# Patient Record
Sex: FEMALE | Race: WHITE | NOT HISPANIC OR LATINO | URBAN - METROPOLITAN AREA
[De-identification: names, ages, dates, MRNs, and addresses within clinical notes are randomized per-mention and may not be internally consistent; named-entity substitution may affect disease eponyms.]

---

## 2018-01-23 ENCOUNTER — INPATIENT (INPATIENT)
Facility: HOSPITAL | Age: 58
LOS: 0 days | Discharge: HOME | End: 2018-01-24
Attending: INTERNAL MEDICINE

## 2018-02-01 DIAGNOSIS — F32.9 MAJOR DEPRESSIVE DISORDER, SINGLE EPISODE, UNSPECIFIED: ICD-10-CM

## 2018-02-01 DIAGNOSIS — Z90.49 ACQUIRED ABSENCE OF OTHER SPECIFIED PARTS OF DIGESTIVE TRACT: ICD-10-CM

## 2018-02-01 DIAGNOSIS — I10 ESSENTIAL (PRIMARY) HYPERTENSION: ICD-10-CM

## 2018-02-01 DIAGNOSIS — M19.90 UNSPECIFIED OSTEOARTHRITIS, UNSPECIFIED SITE: ICD-10-CM

## 2018-02-01 DIAGNOSIS — R10.9 UNSPECIFIED ABDOMINAL PAIN: ICD-10-CM

## 2018-02-01 DIAGNOSIS — E78.5 HYPERLIPIDEMIA, UNSPECIFIED: ICD-10-CM

## 2018-02-01 DIAGNOSIS — K21.9 GASTRO-ESOPHAGEAL REFLUX DISEASE WITHOUT ESOPHAGITIS: ICD-10-CM

## 2018-02-01 DIAGNOSIS — Z90.710 ACQUIRED ABSENCE OF BOTH CERVIX AND UTERUS: ICD-10-CM

## 2018-02-01 DIAGNOSIS — Z79.82 LONG TERM (CURRENT) USE OF ASPIRIN: ICD-10-CM

## 2018-02-01 DIAGNOSIS — Z82.3 FAMILY HISTORY OF STROKE: ICD-10-CM

## 2018-02-01 PROBLEM — Z00.00 ENCOUNTER FOR PREVENTIVE HEALTH EXAMINATION: Status: ACTIVE | Noted: 2018-02-01

## 2018-02-04 DIAGNOSIS — G47.00 INSOMNIA, UNSPECIFIED: ICD-10-CM

## 2018-02-04 DIAGNOSIS — K29.50 UNSPECIFIED CHRONIC GASTRITIS WITHOUT BLEEDING: ICD-10-CM

## 2018-02-04 DIAGNOSIS — M19.90 UNSPECIFIED OSTEOARTHRITIS, UNSPECIFIED SITE: ICD-10-CM

## 2018-02-04 DIAGNOSIS — I10 ESSENTIAL (PRIMARY) HYPERTENSION: ICD-10-CM

## 2018-02-04 DIAGNOSIS — F93.0 SEPARATION ANXIETY DISORDER OF CHILDHOOD: ICD-10-CM

## 2018-02-04 DIAGNOSIS — E78.5 HYPERLIPIDEMIA, UNSPECIFIED: ICD-10-CM

## 2018-02-04 DIAGNOSIS — R10.9 UNSPECIFIED ABDOMINAL PAIN: ICD-10-CM

## 2018-02-04 DIAGNOSIS — K29.70 GASTRITIS, UNSPECIFIED, WITHOUT BLEEDING: ICD-10-CM

## 2018-02-04 DIAGNOSIS — R06.02 SHORTNESS OF BREATH: ICD-10-CM

## 2018-03-17 ENCOUNTER — OUTPATIENT (OUTPATIENT)
Dept: OUTPATIENT SERVICES | Facility: HOSPITAL | Age: 58
LOS: 1 days | Discharge: HOME | End: 2018-03-17

## 2018-03-17 DIAGNOSIS — R91.1 SOLITARY PULMONARY NODULE: ICD-10-CM

## 2018-09-01 ENCOUNTER — INPATIENT (INPATIENT)
Facility: HOSPITAL | Age: 58
LOS: 3 days | Discharge: HOME | End: 2018-09-05
Attending: INTERNAL MEDICINE | Admitting: INTERNAL MEDICINE

## 2018-09-01 VITALS
TEMPERATURE: 97 F | SYSTOLIC BLOOD PRESSURE: 144 MMHG | DIASTOLIC BLOOD PRESSURE: 68 MMHG | OXYGEN SATURATION: 99 % | RESPIRATION RATE: 18 BRPM | HEART RATE: 55 BPM

## 2018-09-01 LAB
ALBUMIN SERPL ELPH-MCNC: 4.4 G/DL — SIGNIFICANT CHANGE UP (ref 3.5–5.2)
ALP SERPL-CCNC: 48 U/L — SIGNIFICANT CHANGE UP (ref 30–115)
ALT FLD-CCNC: 22 U/L — SIGNIFICANT CHANGE UP (ref 0–41)
ANION GAP SERPL CALC-SCNC: 21 MMOL/L — HIGH (ref 7–14)
AST SERPL-CCNC: 20 U/L — SIGNIFICANT CHANGE UP (ref 0–41)
BASE EXCESS BLDV CALC-SCNC: 4 MMOL/L — HIGH (ref -2–2)
BASOPHILS # BLD AUTO: 0.03 K/UL — SIGNIFICANT CHANGE UP (ref 0–0.2)
BASOPHILS NFR BLD AUTO: 0.4 % — SIGNIFICANT CHANGE UP (ref 0–1)
BILIRUB SERPL-MCNC: 0.3 MG/DL — SIGNIFICANT CHANGE UP (ref 0.2–1.2)
BUN SERPL-MCNC: 10 MG/DL — SIGNIFICANT CHANGE UP (ref 10–20)
CA-I SERPL-SCNC: 1.21 MMOL/L — SIGNIFICANT CHANGE UP (ref 1.12–1.3)
CALCIUM SERPL-MCNC: 10 MG/DL — SIGNIFICANT CHANGE UP (ref 8.5–10.1)
CHLORIDE SERPL-SCNC: 99 MMOL/L — SIGNIFICANT CHANGE UP (ref 98–110)
CO2 SERPL-SCNC: 22 MMOL/L — SIGNIFICANT CHANGE UP (ref 17–32)
CREAT SERPL-MCNC: 0.7 MG/DL — SIGNIFICANT CHANGE UP (ref 0.7–1.5)
EOSINOPHIL # BLD AUTO: 0.2 K/UL — SIGNIFICANT CHANGE UP (ref 0–0.7)
EOSINOPHIL NFR BLD AUTO: 2.5 % — SIGNIFICANT CHANGE UP (ref 0–8)
GAS PNL BLDV: 144 MMOL/L — SIGNIFICANT CHANGE UP (ref 136–145)
GAS PNL BLDV: SIGNIFICANT CHANGE UP
GLUCOSE SERPL-MCNC: 107 MG/DL — HIGH (ref 70–99)
HCO3 BLDV-SCNC: 29 MMOL/L — SIGNIFICANT CHANGE UP (ref 22–29)
HCT VFR BLD CALC: 39.8 % — SIGNIFICANT CHANGE UP (ref 37–47)
HCT VFR BLDA CALC: 35.2 % — SIGNIFICANT CHANGE UP (ref 34–44)
HGB BLD CALC-MCNC: 11.5 G/DL — LOW (ref 14–18)
HGB BLD-MCNC: 13 G/DL — SIGNIFICANT CHANGE UP (ref 12–16)
IMM GRANULOCYTES NFR BLD AUTO: 0.4 % — HIGH (ref 0.1–0.3)
LACTATE BLDV-MCNC: 1.5 MMOL/L — SIGNIFICANT CHANGE UP (ref 0.5–1.6)
LYMPHOCYTES # BLD AUTO: 2.63 K/UL — SIGNIFICANT CHANGE UP (ref 1.2–3.4)
LYMPHOCYTES # BLD AUTO: 32.5 % — SIGNIFICANT CHANGE UP (ref 20.5–51.1)
MCHC RBC-ENTMCNC: 28.3 PG — SIGNIFICANT CHANGE UP (ref 27–31)
MCHC RBC-ENTMCNC: 32.7 G/DL — SIGNIFICANT CHANGE UP (ref 32–37)
MCV RBC AUTO: 86.5 FL — SIGNIFICANT CHANGE UP (ref 81–99)
MONOCYTES # BLD AUTO: 0.49 K/UL — SIGNIFICANT CHANGE UP (ref 0.1–0.6)
MONOCYTES NFR BLD AUTO: 6.1 % — SIGNIFICANT CHANGE UP (ref 1.7–9.3)
NEUTROPHILS # BLD AUTO: 4.71 K/UL — SIGNIFICANT CHANGE UP (ref 1.4–6.5)
NEUTROPHILS NFR BLD AUTO: 58.1 % — SIGNIFICANT CHANGE UP (ref 42.2–75.2)
NRBC # BLD: 0 /100 WBCS — SIGNIFICANT CHANGE UP (ref 0–0)
NT-PROBNP SERPL-SCNC: 90 PG/ML — SIGNIFICANT CHANGE UP (ref 0–300)
PCO2 BLDV: 42 MMHG — SIGNIFICANT CHANGE UP (ref 41–51)
PH BLDV: 7.44 — HIGH (ref 7.26–7.43)
PLATELET # BLD AUTO: 262 K/UL — SIGNIFICANT CHANGE UP (ref 130–400)
PO2 BLDV: 36 MMHG — SIGNIFICANT CHANGE UP (ref 20–40)
POTASSIUM BLDV-SCNC: 3.3 MMOL/L — SIGNIFICANT CHANGE UP (ref 3.3–5.6)
POTASSIUM SERPL-MCNC: 4.2 MMOL/L — SIGNIFICANT CHANGE UP (ref 3.5–5)
POTASSIUM SERPL-SCNC: 4.2 MMOL/L — SIGNIFICANT CHANGE UP (ref 3.5–5)
PROT SERPL-MCNC: 6.9 G/DL — SIGNIFICANT CHANGE UP (ref 6–8)
RBC # BLD: 4.6 M/UL — SIGNIFICANT CHANGE UP (ref 4.2–5.4)
RBC # FLD: 13.3 % — SIGNIFICANT CHANGE UP (ref 11.5–14.5)
SAO2 % BLDV: 68 % — SIGNIFICANT CHANGE UP
SODIUM SERPL-SCNC: 142 MMOL/L — SIGNIFICANT CHANGE UP (ref 135–146)
TROPONIN T SERPL-MCNC: <0.01 NG/ML — SIGNIFICANT CHANGE UP
WBC # BLD: 8.09 K/UL — SIGNIFICANT CHANGE UP (ref 4.8–10.8)
WBC # FLD AUTO: 8.09 K/UL — SIGNIFICANT CHANGE UP (ref 4.8–10.8)

## 2018-09-01 RX ORDER — IPRATROPIUM/ALBUTEROL SULFATE 18-103MCG
3 AEROSOL WITH ADAPTER (GRAM) INHALATION ONCE
Qty: 0 | Refills: 0 | Status: COMPLETED | OUTPATIENT
Start: 2018-09-01 | End: 2018-09-01

## 2018-09-01 RX ORDER — ALBUTEROL 90 UG/1
2.5 AEROSOL, METERED ORAL ONCE
Qty: 0 | Refills: 0 | Status: COMPLETED | OUTPATIENT
Start: 2018-09-01 | End: 2018-09-01

## 2018-09-01 RX ORDER — AMITRIPTYLINE HCL 25 MG
25 TABLET ORAL ONCE
Qty: 0 | Refills: 0 | Status: COMPLETED | OUTPATIENT
Start: 2018-09-01 | End: 2018-09-01

## 2018-09-01 RX ORDER — FAMOTIDINE 10 MG/ML
20 INJECTION INTRAVENOUS ONCE
Qty: 0 | Refills: 0 | Status: COMPLETED | OUTPATIENT
Start: 2018-09-01 | End: 2018-09-01

## 2018-09-01 RX ORDER — MAGNESIUM SULFATE 500 MG/ML
2 VIAL (ML) INJECTION ONCE
Qty: 0 | Refills: 0 | Status: COMPLETED | OUTPATIENT
Start: 2018-09-01 | End: 2018-09-01

## 2018-09-01 RX ADMIN — Medication 3 MILLILITER(S): at 16:47

## 2018-09-01 RX ADMIN — ALBUTEROL 2.5 MILLIGRAM(S): 90 AEROSOL, METERED ORAL at 23:45

## 2018-09-01 RX ADMIN — Medication 25 MILLIGRAM(S): at 21:43

## 2018-09-01 RX ADMIN — Medication 30 MILLILITER(S): at 23:45

## 2018-09-01 RX ADMIN — Medication 80 MILLIGRAM(S): at 23:37

## 2018-09-01 RX ADMIN — Medication 3 MILLILITER(S): at 17:51

## 2018-09-01 RX ADMIN — Medication 125 MILLIGRAM(S): at 16:47

## 2018-09-01 RX ADMIN — Medication 50 GRAM(S): at 16:55

## 2018-09-01 RX ADMIN — FAMOTIDINE 104 MILLIGRAM(S): 10 INJECTION INTRAVENOUS at 23:45

## 2018-09-01 RX ADMIN — Medication 3 MILLILITER(S): at 18:51

## 2018-09-01 NOTE — ED PROVIDER NOTE - ATTENDING CONTRIBUTION TO CARE
58 year old female, hx "wheezing" in the past, former smoker, never official dx with COPD, arthritis, HTN, presenting with a several month history of dyspnea, that worsened over the past few days. Patient states she had a nuclear stress test a few weeks ago which was negative. Denies fevers, headache, vision changes, weakness/numbness, confusion, URI symptoms, neck pain, back pain, palpitations, nausea, vomiting, abdominal pain, diarrhea, constipation, blood in stool/dark stools, urinary symptoms, vaginal bleeding/discharge, leg swelling, rash, recent travel or sick contacts, but admits to dry cough.    Vital Signs: I have reviewed the initial vital signs.  Constitutional: NAD, well-nourished, appears stated age, no acute distress.  HEENT: Airway patent, moist MM, no erythema/swelling/deformity of oral structures. EOMI, PERRLA.  CV: regular rate, regular rhythm, well-perfused extremities, 2+ b/l DP and radial pulses equal.  Lungs: Bilateral wheezing, no increased WOB.  ABD: NTND, no guarding or rebound, no pulsatile mass, no hernias.   MSK: Neck supple, nontender, nl ROM, no stepoff. Chest nontender. Back nontender in TLS spine or to b/l bony structures or flanks. Ext nontender, nl rom, no deformity.   INTEG: Skin warm, dry, no rash.  NEURO: A&Ox3, CN II-XII intact, normal strength 5/5 all 4 ext, nl sensation throughout, normal speech and coordination.  PSYCH: Calm, cooperative, normal affect and interaction.    Will tx with nebs, solumedrol, magnesium, get labs, CXR, re-eval.

## 2018-09-01 NOTE — ED CDU PROVIDER INITIAL DAY NOTE - OBJECTIVE STATEMENT
57y/o female with pmh of htn, hld, acid reflux, pt. c/o sob for several weeks and worsening over the last couple of days. denies fever, chills, cough, cp, nausea, vomiting. pt. states she was wheezing and had similar sx many times in the past but she has never seen a pulmonologist. ex smoker. cardiologist dr. Krishna. pt. is Lao speaking, translated by daughter in law who is at bedside.

## 2018-09-01 NOTE — ED PROVIDER NOTE - MEDICAL DECISION MAKING DETAILS
Patient presented with dyspnea and wheezing, suspected COPD exacerbation given extensive smoking history. Patient give treatment in ED which improved her breathing significantly but still had mild intermittent dyspnea. Patient otherwise well appearing and mild enough that she met observation criteria per obs protocol, and therefore she was placed in obs for further tx and monitoring, with re-eval in the AM.

## 2018-09-01 NOTE — ED PROVIDER NOTE - PHYSICAL EXAMINATION
CONSTITUTIONAL: Well-developed; well-nourished; in no acute distress.   SKIN: warm, dry  HEAD: Normocephalic; atraumatic.  EYES: PERRL, EOMI, normal sclera and conjunctiva   ENT: No nasal discharge; airway clear.  NECK: Supple; non tender.  CARD: S1, S2 normal; no murmurs, gallops, or rubs. Regular rate and rhythm.   RESP: wheezes heard b/l. mildly increased wob.  ABD: soft ntnd  EXT: Normal ROM.  No clubbing, cyanosis or edema.   LYMPH: No acute cervical adenopathy.  NEURO: Alert, oriented, grossly unremarkable  PSYCH: Cooperative, appropriate.

## 2018-09-01 NOTE — ED PROVIDER NOTE - PROGRESS NOTE DETAILS
Patient re-evaluated - feeling better after tx but still mildly wheezy on exam. Patient meets obs criteria given mild COPD, no PNA, O2 saturation on RA 99%. Will place in obs.

## 2018-09-01 NOTE — ED PROVIDER NOTE - NS ED ROS FT
Eyes:  No visual changes, eye pain or discharge.  ENMT:  No hearing changes, pain, discharge or infections. No neck pain or stiffness.  Cardiac:  No chest pain, SOB or edema. No chest pain with exertion.  Respiratory:  No cough. +sob  GI:  No nausea, vomiting, diarrhea or abdominal pain.  :  No dysuria, frequency or burning.  MS:  No myalgia, muscle weakness, joint pain or back pain.  Neuro:  No headache or weakness.  No LOC.  Skin:  No skin rash.   Endocrine: No history of thyroid disease or diabetes.

## 2018-09-01 NOTE — ED ADULT NURSE REASSESSMENT NOTE - NS ED NURSE REASSESS COMMENT FT1
Pt reassessed report filling the same ,C.O left  side upper back pain comfort provide HOB elevated 35degree ED attending made aware SPO2 98% R.A on going nursing observation .

## 2018-09-01 NOTE — ED PROVIDER NOTE - OBJECTIVE STATEMENT
pt is a 58yof with no pmhx complaining of worsening sob for the last week. Pt was a lifetime smoker, but has no diagnosis of COPD. pt denies cough, chest pain, fever, abdominal pain, headache, leg swelling, dysuria, diarrhea, nausea, vomiting

## 2018-09-01 NOTE — ED ADULT NURSE REASSESSMENT NOTE - NS ED NURSE REASSESS COMMENT FT1
patient continues on observation. cardiac monitoring in place. NAD noted. patient resting comfortably. all care rendered.

## 2018-09-01 NOTE — ED CDU PROVIDER INITIAL DAY NOTE - ATTENDING CONTRIBUTION TO CARE
Seen with PA agree with above, lungs- scattered wheezing, abdomen- soft non tender, s1s2 no gallops or murmurs, full workup in progress will continue to re-evaluate

## 2018-09-01 NOTE — ED ADULT NURSE NOTE - NSIMPLEMENTINTERV_GEN_ALL_ED
Implemented All Universal Safety Interventions:  [place] to call system. Call bell, personal items and telephone within reach. Instruct patient to call for assistance. Room bathroom lighting operational. Non-slip footwear when patient is off stretcher. Physically safe environment: no spills, clutter or unnecessary equipment. Stretcher in lowest position, wheels locked, appropriate side rails in place.

## 2018-09-02 DIAGNOSIS — Z90.710 ACQUIRED ABSENCE OF BOTH CERVIX AND UTERUS: Chronic | ICD-10-CM

## 2018-09-02 DIAGNOSIS — Z90.49 ACQUIRED ABSENCE OF OTHER SPECIFIED PARTS OF DIGESTIVE TRACT: Chronic | ICD-10-CM

## 2018-09-02 DIAGNOSIS — Z98.890 OTHER SPECIFIED POSTPROCEDURAL STATES: Chronic | ICD-10-CM

## 2018-09-02 RX ORDER — ASPIRIN/CALCIUM CARB/MAGNESIUM 324 MG
81 TABLET ORAL DAILY
Qty: 0 | Refills: 0 | Status: DISCONTINUED | OUTPATIENT
Start: 2018-09-02 | End: 2018-09-05

## 2018-09-02 RX ORDER — ALBUTEROL 90 UG/1
2.5 AEROSOL, METERED ORAL ONCE
Qty: 0 | Refills: 0 | Status: COMPLETED | OUTPATIENT
Start: 2018-09-02 | End: 2018-09-02

## 2018-09-02 RX ORDER — HYDROCHLOROTHIAZIDE 25 MG
12.5 TABLET ORAL ONCE
Qty: 0 | Refills: 0 | Status: COMPLETED | OUTPATIENT
Start: 2018-09-02 | End: 2018-09-02

## 2018-09-02 RX ORDER — AMITRIPTYLINE HCL 25 MG
25 TABLET ORAL AT BEDTIME
Qty: 0 | Refills: 0 | Status: DISCONTINUED | OUTPATIENT
Start: 2018-09-02 | End: 2018-09-05

## 2018-09-02 RX ORDER — METOPROLOL TARTRATE 50 MG
50 TABLET ORAL DAILY
Qty: 0 | Refills: 0 | Status: DISCONTINUED | OUTPATIENT
Start: 2018-09-02 | End: 2018-09-05

## 2018-09-02 RX ORDER — METOPROLOL TARTRATE 50 MG
1 TABLET ORAL
Qty: 0 | Refills: 0 | COMMUNITY

## 2018-09-02 RX ORDER — LOSARTAN POTASSIUM 100 MG/1
1 TABLET, FILM COATED ORAL
Qty: 0 | Refills: 0 | COMMUNITY

## 2018-09-02 RX ORDER — ATORVASTATIN CALCIUM 80 MG/1
20 TABLET, FILM COATED ORAL AT BEDTIME
Qty: 0 | Refills: 0 | Status: DISCONTINUED | OUTPATIENT
Start: 2018-09-02 | End: 2018-09-05

## 2018-09-02 RX ORDER — HEPARIN SODIUM 5000 [USP'U]/ML
5000 INJECTION INTRAVENOUS; SUBCUTANEOUS EVERY 8 HOURS
Qty: 0 | Refills: 0 | Status: DISCONTINUED | OUTPATIENT
Start: 2018-09-02 | End: 2018-09-05

## 2018-09-02 RX ORDER — ASPIRIN/CALCIUM CARB/MAGNESIUM 324 MG
1 TABLET ORAL
Qty: 0 | Refills: 0 | COMMUNITY

## 2018-09-02 RX ORDER — IPRATROPIUM/ALBUTEROL SULFATE 18-103MCG
3 AEROSOL WITH ADAPTER (GRAM) INHALATION EVERY 6 HOURS
Qty: 0 | Refills: 0 | Status: DISCONTINUED | OUTPATIENT
Start: 2018-09-02 | End: 2018-09-05

## 2018-09-02 RX ORDER — LOSARTAN POTASSIUM 100 MG/1
50 TABLET, FILM COATED ORAL DAILY
Qty: 0 | Refills: 0 | Status: DISCONTINUED | OUTPATIENT
Start: 2018-09-02 | End: 2018-09-05

## 2018-09-02 RX ORDER — AMITRIPTYLINE HCL 25 MG
1 TABLET ORAL
Qty: 0 | Refills: 0 | COMMUNITY

## 2018-09-02 RX ORDER — HYDROCHLOROTHIAZIDE 25 MG
12.5 TABLET ORAL DAILY
Qty: 0 | Refills: 0 | Status: DISCONTINUED | OUTPATIENT
Start: 2018-09-02 | End: 2018-09-05

## 2018-09-02 RX ORDER — PANTOPRAZOLE SODIUM 20 MG/1
40 TABLET, DELAYED RELEASE ORAL
Qty: 0 | Refills: 0 | Status: DISCONTINUED | OUTPATIENT
Start: 2018-09-02 | End: 2018-09-05

## 2018-09-02 RX ORDER — METOPROLOL TARTRATE 50 MG
0 TABLET ORAL
Qty: 0 | Refills: 0 | COMMUNITY

## 2018-09-02 RX ORDER — OMEPRAZOLE 10 MG/1
1 CAPSULE, DELAYED RELEASE ORAL
Qty: 0 | Refills: 0 | COMMUNITY

## 2018-09-02 RX ADMIN — Medication 12.5 MILLIGRAM(S): at 15:45

## 2018-09-02 RX ADMIN — ALBUTEROL 2.5 MILLIGRAM(S): 90 AEROSOL, METERED ORAL at 05:35

## 2018-09-02 RX ADMIN — LOSARTAN POTASSIUM 50 MILLIGRAM(S): 100 TABLET, FILM COATED ORAL at 15:46

## 2018-09-02 RX ADMIN — Medication 25 MILLIGRAM(S): at 21:17

## 2018-09-02 RX ADMIN — ATORVASTATIN CALCIUM 20 MILLIGRAM(S): 80 TABLET, FILM COATED ORAL at 21:17

## 2018-09-02 NOTE — H&P ADULT - ATTENDING COMMENTS
57 YO F with PMH HTN, GERD presents with 2 days of cough, SOB, posttussive emesis, and chronic complaints of throat tightening, difficulty swallowing, regurgitation. CXR unremarkable, labs unremarkable. Has undergone extensive workup for these complaints including EGD, barium swallow, nissen fundoplication, CT scans with no etiology elucidated.     Pt seen and examined    chart reviewed    agree with initial plan      O2 prn    empiric steroids    pulm eval Dr Ashley Garibay

## 2018-09-02 NOTE — ED CDU PROVIDER SUBSEQUENT DAY NOTE - PROGRESS NOTE DETAILS
Walked pt around ED[facility] with pulse ox. pt's O2 sat never dropped below 97%. Walked pt around [facility] with pulse ox. pt's O2 sat never dropped below 97%, but pt became tachypneic, tachycardic and felt SOB after. will admit pt family left. contact number-[phone]

## 2018-09-02 NOTE — H&P ADULT - NSHPREVIEWOFSYSTEMS_GEN_ALL_CORE
REVIEW OF SYSTEMS:    CONSTITUTIONAL: No weakness, fevers or chills  EYES/ENT: No visual changes;  No vertigo or throat pain   NECK: No pain or stiffness  RESPIRATORY: See HPI  CARDIOVASCULAR: + chest pain, no palpitations  GASTROINTESTINAL: No abdominal or epigastric pain. No nausea, + vomiting, no hematemesis; No diarrhea or constipation. No melena or hematochezia.  GENITOURINARY: No dysuria, frequency or hematuria  NEUROLOGICAL: No numbness or weakness  MUSCULOSKELETAL: No muscle or joint pain, stiffness, or swelling  SKIN: No itching, rashes REVIEW OF SYSTEMS:    CONSTITUTIONAL: No weakness, fevers or chills  EYES/ENT: No visual changes;  No vertigo or throat pain   NECK: No pain or stiffness  RESPIRATORY: See HPI  CARDIOVASCULAR: + chest pain, no palpitations  GASTROINTESTINAL: See HPI  GENITOURINARY: No dysuria, frequency or hematuria  NEUROLOGICAL: No numbness or weakness  MUSCULOSKELETAL: No muscle or joint pain, stiffness, or swelling  SKIN: No itching, rashes

## 2018-09-02 NOTE — ED CDU PROVIDER DISPOSITION NOTE - CLINICAL COURSE
Patient was sent to [facility] for further evaluation of coughing and increasing SOB, Has received multiple nebulizer treatments, IV steroids feeling bettwer but still SOB with any type of ambulation, Patient will need admission for further treatments and pulmonary evaluation, MAR aware of admission, Pulse ox has been normal throughout  ED[facility] stay

## 2018-09-02 NOTE — ED CDU PROVIDER SUBSEQUENT DAY NOTE - MEDICAL DECISION MAKING DETAILS
Patient feeling better but still SOB with any type of ambulation needs admission for further pulmonary evaluation

## 2018-09-02 NOTE — H&P ADULT - HISTORY OF PRESENT ILLNESS
This is a 59 YO F, PMH HTN, GERD, former long term smoker who presents with several days of productive cough, SOB, intermittent chest pain, and posttussive emesis. Interview conducted through daughter in law at bedside as preferred by pt. She denies fevers, chills, abdominal pain, nausea, diarrhea, LE edema, palpitations. Was initially admitted to OBS but when testing ambulatory oxygen saturation she remained at 97% on RA but reported dyspnea/SOB and so she was referred for admission.    Vital Signs Last 24 Hrs  T(C): 36.4 (02 Sep 2018 07:22), Max: 36.4 (02 Sep 2018 07:22)  T(F): 97.6 (02 Sep 2018 07:22), Max: 97.6 (02 Sep 2018 07:22)  HR: 60 (02 Sep 2018 07:22) (55 - 75)  BP: 106/57 (02 Sep 2018 07:22) (106/57 - 148/72)  BP(mean): --  RR: 18 (02 Sep 2018 07:22) (18 - 18)  SpO2: 95% (02 Sep 2018 07:22) (95% - 99%) This is a 57 YO F, PMH HTN, GERD, former long term smoker (quit 10-15 years ago) who presents with several days of "throat tightness", productive cough, SOB, intermittent chest pain, and posttussive emesis. Interview conducted through daughter in law at bedside as preferred by pt. She denies fevers, chills, abdominal pain, nausea, diarrhea, LE edema, palpitations. Was initially admitted to OBS but when testing ambulatory oxygen saturation she remained at 97% on RA but reported dyspnea/SOB and so she was referred for admission. Upon further questioning she reports her discomfort is more localized to her throat, feeling of restriction when breathing, with phlegm production but coughing only present with deep inhalation. Patient has been dealing with similar issue for many years, has been followed by GI at [facility] and had a Nissen fundoplication but has not had relief. Has been admitted several times over the last few years for similar complaints.     Vital Signs Last 24 Hrs  T(C): 36.4 (02 Sep 2018 07:22), Max: 36.4 (02 Sep 2018 07:22)  T(F): 97.6 (02 Sep 2018 07:22), Max: 97.6 (02 Sep 2018 07:22)  HR: 60 (02 Sep 2018 07:22) (55 - 75)  BP: 106/57 (02 Sep 2018 07:22) (106/57 - 148/72)  BP(mean): --  RR: 18 (02 Sep 2018 07:22) (18 - 18)  SpO2: 95% (02 Sep 2018 07:22) (95% - 99%)

## 2018-09-02 NOTE — ED CDU PROVIDER DISPOSITION NOTE - ATTENDING CONTRIBUTION TO CARE
Please see clinical course, Patient will need admission for further treatments and pulmonary evaluation

## 2018-09-02 NOTE — ED ADULT NURSE REASSESSMENT NOTE - NS ED NURSE REASSESS COMMENT FT1
Patient on observation, continuous cardiac monitor in place. vitals recorded. NAD noted. monitoring continues

## 2018-09-02 NOTE — ED CDU PROVIDER SUBSEQUENT DAY NOTE - HISTORY
Received sign out from PENNY Higgins. 58y/oF, PMH htn, hld, GERD, c/o sob for several weeks and worsening over the last couple of days. Pt also reports episodes of cough with post tussive emesis. Pt is a former smoker with similar episodes in the past. Pt has never seen a pulmonologist before. Does not have a nebulizer. No acute events overnight. pulse ox never dropped below 89. denies fever, chills, cp, nausea, vomiting.

## 2018-09-02 NOTE — H&P ADULT - NSHPPHYSICALEXAM_GEN_ALL_CORE
PHYSICAL EXAM:  GENERAL: Elderly F, lying in bed, in NAD  HEAD:  Atraumatic, Normocephalic  MOUTH/OROPHARYNX: No abnormalities, normal oropharynx  EYES: EOMI, PERRLA, conjunctiva and sclera clear  NECK: Supple, No JVD  CHEST/LUNG: Clear to auscultation bilaterally; No wheeze  HEART: Regular rate and rhythm; No murmurs, rubs, or gallops  ABDOMEN: Soft, Nontender, Nondistended; Bowel sounds present  EXTREMITIES:  2+ Peripheral Pulses, No clubbing, cyanosis, or edema  PSYCH: AAOx3  NEUROLOGY: non-focal  SKIN: No rashes or lesions

## 2018-09-02 NOTE — ED CDU PROVIDER SUBSEQUENT DAY NOTE - NS ED ROS FT
Review of Systems:  CONSTITUTIONAL: no fever  EYES: no photophobia, no blurred vision  ENT: no ear pain, no nasal discharge  RESPIRATORY: +shortness of breath, +cough  CARDIAC: no chest pain, no palpitations  GI: no abd pain, no nausea,   : no dysuria; no hematuria,   MUSCULOSKELETAL: no joint paint  NEUROLOGIC: no headache,   PSYCH: no anxiety, non suicidal, non homicidal, no hallucination, no depression

## 2018-09-02 NOTE — H&P ADULT - ASSESSMENT
57 YO F with PMH HTN, GERD presents with 2 days of cough, SOB, posttussive emesis. CXR unremarkable, labs unremarkable. Most likely etiology undiagnosed COPD exacerbation, has never seen Pulm before. 57 YO F with PMH HTN, GERD presents with 2 days of cough, SOB, posttussive emesis, and chronic complaints of throat tightening, difficulty swallowing, regurgitation. CXR unremarkable, labs unremarkable. Has undergone extensive workup for these complaints including EGD, barium swallow, nissen fundoplication, CT scans with no etiology elucidated. 59 YO F with PMH HTN, GERD presents with 2 days of cough, SOB, posttussive emesis, and chronic complaints of throat tightening, difficulty swallowing, regurgitation. CXR unremarkable, labs unremarkable. Has undergone extensive workup for these complaints including EGD, barium swallow, nissen fundoplication, CT scans with no etiology elucidated.     1) Dyspepsia/GERD/Regurgitation  - No findings on physical exam  - Increase PPI to 40mg BID  - Consider GI consult  - continue bentyl, amitriptyline     2) Cough, SOB: likely undiagnosed COPD  - continue with nebulizer treatments  - continue solumedrol iv 60mg daily  - consider pulm consult    3) HTN  - continue home meds: losartan, HCTZ, metoprolol, asa    4) Ppx, diet, dispo  - SQ heparin  - DASH diet  - Full code, from home

## 2018-09-02 NOTE — ED ADULT NURSE REASSESSMENT NOTE - NS ED NURSE REASSESS COMMENT FT1
Pt assessed A.O times 4 Vs taken stable denies no chest pain no  SOB no N.V  comfort provide , HOB elevated report filling slight better ambulate steady Ed attending made aware of pt status on going nursing observation .

## 2018-09-02 NOTE — H&P ADULT - PSH
No significant past surgical history H/O total hysterectomy    Status post cholecystectomy    Status post laparoscopic Nissen fundoplication

## 2018-09-03 LAB
ALBUMIN SERPL ELPH-MCNC: 4.2 G/DL — SIGNIFICANT CHANGE UP (ref 3.5–5.2)
ALP SERPL-CCNC: 42 U/L — SIGNIFICANT CHANGE UP (ref 30–115)
ALT FLD-CCNC: 23 U/L — SIGNIFICANT CHANGE UP (ref 0–41)
ANION GAP SERPL CALC-SCNC: 12 MMOL/L — SIGNIFICANT CHANGE UP (ref 7–14)
AST SERPL-CCNC: 19 U/L — SIGNIFICANT CHANGE UP (ref 0–41)
BASOPHILS # BLD AUTO: 0.02 K/UL — SIGNIFICANT CHANGE UP (ref 0–0.2)
BASOPHILS NFR BLD AUTO: 0.2 % — SIGNIFICANT CHANGE UP (ref 0–1)
BILIRUB DIRECT SERPL-MCNC: <0.2 MG/DL — SIGNIFICANT CHANGE UP (ref 0–0.2)
BILIRUB INDIRECT FLD-MCNC: >0.2 MG/DL — SIGNIFICANT CHANGE UP (ref 0.2–1.2)
BILIRUB SERPL-MCNC: 0.4 MG/DL — SIGNIFICANT CHANGE UP (ref 0.2–1.2)
BUN SERPL-MCNC: 18 MG/DL — SIGNIFICANT CHANGE UP (ref 10–20)
CALCIUM SERPL-MCNC: 9.6 MG/DL — SIGNIFICANT CHANGE UP (ref 8.5–10.1)
CHLORIDE SERPL-SCNC: 102 MMOL/L — SIGNIFICANT CHANGE UP (ref 98–110)
CO2 SERPL-SCNC: 28 MMOL/L — SIGNIFICANT CHANGE UP (ref 17–32)
CREAT SERPL-MCNC: 0.8 MG/DL — SIGNIFICANT CHANGE UP (ref 0.7–1.5)
EOSINOPHIL # BLD AUTO: 0.05 K/UL — SIGNIFICANT CHANGE UP (ref 0–0.7)
EOSINOPHIL NFR BLD AUTO: 0.4 % — SIGNIFICANT CHANGE UP (ref 0–8)
GLUCOSE SERPL-MCNC: 123 MG/DL — HIGH (ref 70–99)
HCT VFR BLD CALC: 39.9 % — SIGNIFICANT CHANGE UP (ref 37–47)
HGB BLD-MCNC: 12.9 G/DL — SIGNIFICANT CHANGE UP (ref 12–16)
IMM GRANULOCYTES NFR BLD AUTO: 0.4 % — HIGH (ref 0.1–0.3)
LYMPHOCYTES # BLD AUTO: 28.9 % — SIGNIFICANT CHANGE UP (ref 20.5–51.1)
LYMPHOCYTES # BLD AUTO: 3.64 K/UL — HIGH (ref 1.2–3.4)
MAGNESIUM SERPL-MCNC: 2.6 MG/DL — HIGH (ref 1.8–2.4)
MCHC RBC-ENTMCNC: 28.5 PG — SIGNIFICANT CHANGE UP (ref 27–31)
MCHC RBC-ENTMCNC: 32.3 G/DL — SIGNIFICANT CHANGE UP (ref 32–37)
MCV RBC AUTO: 88.1 FL — SIGNIFICANT CHANGE UP (ref 81–99)
MONOCYTES # BLD AUTO: 0.43 K/UL — SIGNIFICANT CHANGE UP (ref 0.1–0.6)
MONOCYTES NFR BLD AUTO: 3.4 % — SIGNIFICANT CHANGE UP (ref 1.7–9.3)
NEUTROPHILS # BLD AUTO: 8.4 K/UL — HIGH (ref 1.4–6.5)
NEUTROPHILS NFR BLD AUTO: 66.7 % — SIGNIFICANT CHANGE UP (ref 42.2–75.2)
PLATELET # BLD AUTO: 242 K/UL — SIGNIFICANT CHANGE UP (ref 130–400)
POTASSIUM SERPL-MCNC: 4.8 MMOL/L — SIGNIFICANT CHANGE UP (ref 3.5–5)
POTASSIUM SERPL-SCNC: 4.8 MMOL/L — SIGNIFICANT CHANGE UP (ref 3.5–5)
PROT SERPL-MCNC: 6.5 G/DL — SIGNIFICANT CHANGE UP (ref 6–8)
RBC # BLD: 4.53 M/UL — SIGNIFICANT CHANGE UP (ref 4.2–5.4)
RBC # FLD: 14.2 % — SIGNIFICANT CHANGE UP (ref 11.5–14.5)
SODIUM SERPL-SCNC: 142 MMOL/L — SIGNIFICANT CHANGE UP (ref 135–146)
WBC # BLD: 12.59 K/UL — HIGH (ref 4.8–10.8)
WBC # FLD AUTO: 12.59 K/UL — HIGH (ref 4.8–10.8)

## 2018-09-03 RX ADMIN — ATORVASTATIN CALCIUM 20 MILLIGRAM(S): 80 TABLET, FILM COATED ORAL at 21:11

## 2018-09-03 RX ADMIN — Medication 20 MILLIGRAM(S): at 06:11

## 2018-09-03 RX ADMIN — Medication 25 MILLIGRAM(S): at 21:11

## 2018-09-03 RX ADMIN — PANTOPRAZOLE SODIUM 40 MILLIGRAM(S): 20 TABLET, DELAYED RELEASE ORAL at 06:11

## 2018-09-03 RX ADMIN — Medication 3 MILLILITER(S): at 07:37

## 2018-09-03 RX ADMIN — Medication 20 MILLIGRAM(S): at 17:11

## 2018-09-03 RX ADMIN — Medication 20 MILLIGRAM(S): at 11:08

## 2018-09-03 RX ADMIN — Medication 3 MILLILITER(S): at 13:01

## 2018-09-03 RX ADMIN — Medication 81 MILLIGRAM(S): at 11:08

## 2018-09-03 RX ADMIN — Medication 3 MILLILITER(S): at 19:46

## 2018-09-03 RX ADMIN — Medication 20 MILLIGRAM(S): at 21:12

## 2018-09-03 RX ADMIN — Medication 60 MILLIGRAM(S): at 06:12

## 2018-09-03 NOTE — PROGRESS NOTE ADULT - SUBJECTIVE AND OBJECTIVE BOX
SUBJECTIVE:    Patient is a 58y old Female who presents with a chief complaint of Cough, SOB, Intermittent chest pain (02 Sep 2018 15:20) Currently admitted to medicine with the primary diagnosis of COPD exacerbation     Today is hospital day 1d. This morning she is resting comfortably in bed and reports no new issues or overnight events. Patient speaks little English but is able to communicate. Patient does not complain of anymore abdominal pain. Patient endorses trouble taking deep breathes. Every time she tries she ends up coughing. Patient is eating and sleeping well. Patient denies CP/SOB/N/V/fevers/chills and any GI/ symptoms.     PAST MEDICAL & SURGICAL HISTORY  GERD (gastroesophageal reflux disease)  CAD (coronary artery disease)  Status post laparoscopic Nissen fundoplication  H/O total hysterectomy  Status post cholecystectomy    SOCIAL HISTORY:  Negative for smoking/alcohol/drug use.     ALLERGIES:  No Known Allergies    MEDICATIONS:  STANDING MEDICATIONS  ALBUTerol/ipratropium for Nebulization 3 milliLiter(s) Nebulizer every 6 hours  amitriptyline 25 milliGRAM(s) Oral at bedtime  aspirin enteric coated 81 milliGRAM(s) Oral daily  atorvastatin 20 milliGRAM(s) Oral at bedtime  dicyclomine 20 milliGRAM(s) Oral four times a day before meals  heparin  Injectable 5000 Unit(s) SubCutaneous every 8 hours  hydrochlorothiazide 12.5 milliGRAM(s) Oral daily  losartan 50 milliGRAM(s) Oral daily  methylPREDNISolone sodium succinate Injectable 60 milliGRAM(s) IV Push daily  metoprolol succinate ER 50 milliGRAM(s) Oral daily  pantoprazole    Tablet 40 milliGRAM(s) Oral before breakfast    PRN MEDICATIONS    VITALS:   T(F): 95.9  HR: 76  BP: 101/60  RR: 18  SpO2: 98%    LABS:                        12.9   12.59 )-----------( 242      ( 03 Sep 2018 08:04 )             39.9     09-03    142  |  102  |  18  ----------------------------<  123<H>  4.8   |  28  |  0.8    Ca    9.6      03 Sep 2018 08:04  Mg     2.6     09-03    TPro  6.5  /  Alb  4.2  /  TBili  0.4  /  DBili  <0.2  /  AST  19  /  ALT  23  /  AlkPhos  42  09-03              CARDIAC MARKERS ( 01 Sep 2018 17:00 )  x     / <0.01 ng/mL / x     / x     / x          RADIOLOGY:  < from: Xray Chest 1 View- PORTABLE-Urgent (09.01.18 @ 17:04) >    Impression:      No radiographic evidence of acute cardiopulmonary disease.    < end of copied text >      PHYSICAL EXAM:  GEN: No acute distress  LUNGS: Clear to auscultation bilaterally, decreased BS b/l   HEART: S1/S2 present. RRR.   ABD: Soft, non-tender, non-distended. Bowel sounds present  EXT: NC/NC/NE/2+PP/HILL/Skin Intact.   NEURO: AAOX3

## 2018-09-03 NOTE — CONSULT NOTE ADULT - ASSESSMENT
57 YO F with PMH HTN, GERD presents with 2 days of cough, SOB, posttussive emesis, and chronic complaints of throat tightening, difficulty swallowing, regurgitation. CXR unremarkable, labs unremarkable. Has undergone extensive workup for these complaints including EGD, barium swallow, nissen fundoplication, CT scans with no etiology elucidated.     1) Dyspepsia/GERD/Regurgitation  lifestyle modifications   Increase PPI to 40mg BID  f/u with GI at [place] This is a 59 YO F, PMH Nissen fundoplication 2 yrs ago, HTN, GERD, former long term smoker (quit 10-15 years ago) who presents with several days of "throat tightness", productive cough, SOB, intermittent chest pain, and posttussive emesis. The patient notes that these [facility] which she describes as gas pain. After nissen, her abd discomfort improved however her throat tightness / cough worsened, she said she tried to fu with her GI at [place] but was never able to reach him. She notes that these symptoms occur every time she eats, denies heartburn and abd pain. She notes that her previous PPI used to help but since they changed it, it s not working that well.     1- Post prandial cough / sob / abd discomfort   r/o uncontrolled reflux vs complication from Nissen vs aspiration   elevate HOB   aspiration precautions   lifestyle modifications for GERD  consider speech and swallow eval   check esophagogram   get records from [place]   will consider EGD   PPI q12h   Pulmonary fu This is a 59 YO F, PMH Nissen fundoplication 2 yrs ago, HTN, GERD, former long term smoker (quit 10-15 years ago) who presents with several days of "throat tightness", productive cough, SOB, intermittent chest pain, and posttussive emesis. The patient notes that these [facility] which [facility] describes as [facility] pain. After nissen, her abd discomfort improved however her throat tightness / cough worsened, she said she tried to fu with her GI at [place] but was never able to reach him. She notes that these symptoms occur every time she eats, denies heartburn and abd pain. She notes that her previous PPI used to help but since they changed it, it s not working that well.     1- Post prandial cough / sob / abd discomfort   r/o uncontrolled reflux   Recommend:  elevate HOB   aspiration precautions   lifestyle modifications for GERD  consider speech and swallow eval   check esophagogram   get records from [place]   will consider EGD   PPI q12h   Pulmonary fu   Patient does not wish to stay for esophagram and GI workup: please refer to [facility] clinic for followup

## 2018-09-03 NOTE — CONSULT NOTE ADULT - SUBJECTIVE AND OBJECTIVE BOX
Patient is a 58y old  Female who presents with a chief complaint of Cough, SOB, Intermittent chest pain       HPI:  This is a 59 YO F, PMH HTN, GERD, former long term smoker (quit 10-15 years ago) who presents with several days of "throat tightness", productive cough, SOB, intermittent chest pain, and posttussive emesis. Interview conducted through daughter in law at bedside as preferred by pt. Upon further questioning she reports her discomfort is more localized to her throat, feeling of restriction when breathing, with phlegm production but coughing only present with deep inhalation. Patient has been dealing with similar issue for many years, has been followed by GI at [facility] and had a Nissen fundoplication but has not had relief. Has been admitted several times over the last few years for similar complaints.     egd 10/2013 for epig discomfort/N/V: s/p Nissen, antritis   colonoscopy 11/2005 dr star mancera for abd pain: adequate prep, mod hemorrhoids     CT abd with ic 1/2018: wnl, post ccy, hep steatosis   Obst series 4/20015: wnl   esophagogram 8/2013: mild esoph dysmotility as tertiary contractions       Vital Signs Last 24 Hrs  T(C): 36.4 (02 Sep 2018 07:22), Max: 36.4 (02 Sep 2018 07:22)  T(F): 97.6 (02 Sep 2018 07:22), Max: 97.6 (02 Sep 2018 07:22)  HR: 60 (02 Sep 2018 07:22) (55 - 75)  BP: 106/57 (02 Sep 2018 07:22) (106/57 - 148/72)  BP(mean): --  RR: 18 (02 Sep 2018 07:22) (18 - 18)  SpO2: 95% (02 Sep 2018 07:22) (95% - 99%) (02 Sep 2018 15:20)          PAST MEDICAL & SURGICAL HISTORY:  GERD (gastroesophageal reflux disease)  CAD (coronary artery disease)  Status post laparoscopic Nissen fundoplication  H/O total hysterectomy  Status post cholecystectomy      Home Medications:  amitriptyline 25 mg oral tablet: 1 tab(s) orally once a day (at bedtime) (02 Sep 2018 17:09)  Aspir 81 oral delayed release tablet: 1 tab(s) orally once a day (02 Sep 2018 17:09)  dicyclomine 20 mg oral tablet: 1 tab(s) orally 4 times a day (02 Sep 2018 17:09)  hydroCHLOROthiazide 12.5 mg oral tablet: 1 tab(s) orally once a day (02 Sep 2018 17:09)  losartan 50 mg oral tablet: 1 tab(s) orally once a day (02 Sep 2018 17:09)  metoprolol succinate 50 mg oral tablet, extended release: 1 tab(s) orally once a day (02 Sep 2018 17:09)  omeprazole 20 mg oral delayed release capsule: 1 cap(s) orally once a day (02 Sep 2018 17:09)      MEDICATIONS  (STANDING):  ALBUTerol/ipratropium for Nebulization 3 milliLiter(s) Nebulizer every 6 hours  amitriptyline 25 milliGRAM(s) Oral at bedtime  aspirin enteric coated 81 milliGRAM(s) Oral daily  atorvastatin 20 milliGRAM(s) Oral at bedtime  dicyclomine 20 milliGRAM(s) Oral four times a day before meals  heparin  Injectable 5000 Unit(s) SubCutaneous every 8 hours  hydrochlorothiazide 12.5 milliGRAM(s) Oral daily  losartan 50 milliGRAM(s) Oral daily  methylPREDNISolone sodium succinate Injectable 60 milliGRAM(s) IV Push daily  metoprolol succinate ER 50 milliGRAM(s) Oral daily  pantoprazole    Tablet 40 milliGRAM(s) Oral before breakfast    MEDICATIONS  (PRN):      Allergies    No Known Allergies    Intolerances        FAMILY HISTORY:  No pertinent family history in first degree relatives      SOCIAL    REVIEW OF SYSTEMS  General: mild fatigue   Skin: no rash   Ophtalmologic: no visual changes   Respiratory: no shortness of breath   Cardiovascular: no chest pain   Gastrointestinal: as per H&P   Genitourinary: no dysuria   Neurological: no weakness   otherwise as described above     Vital Signs Last 24 Hrs  T(C): 35.5 (03 Sep 2018 05:49), Max: 36.3 (02 Sep 2018 15:24)  T(F): 95.9 (03 Sep 2018 05:49), Max: 97.4 (02 Sep 2018 15:24)  HR: 76 (03 Sep 2018 05:49) (70 - 86)  BP: 101/60 (03 Sep 2018 05:49) (101/60 - 117/63)  BP(mean): --  RR: 18 (03 Sep 2018 05:49) (18 - 18)  SpO2: 98% (02 Sep 2018 17:52) (95% - 100%)    GENERAL:  no distress  SKIN: intact   HEENT:  NC/AT,  anicteric  CHEST:   no increased effort, breath sounds clear  HEART:  Regular rhythm  ABDOMEN:  Soft, non-tender, non-distended, normoactive bowel sounds,  no masses ,no hepato-splenomegaly, no signs of chronic liver disease  EXTEREMITIES:  no cyanosis        CBC Full  -  ( 03 Sep 2018 08:04 )  WBC Count : 12.59 K/uL  Hemoglobin : 12.9 g/dL  Hematocrit : 39.9 %  Platelet Count - Automated : 242 K/uL  Mean Cell Volume : 88.1 fL  Mean Cell Hemoglobin : 28.5 pg  Mean Cell Hemoglobin Concentration : 32.3 g/dL  Auto Neutrophil # : 8.40 K/uL  Auto Lymphocyte # : 3.64 K/uL  Auto Monocyte # : 0.43 K/uL  Auto Eosinophil # : 0.05 K/uL  Auto Basophil # : 0.02 K/uL  Auto Neutrophil % : 66.7 %  Auto Lymphocyte % : 28.9 %  Auto Monocyte % : 3.4 %  Auto Eosinophil % : 0.4 %  Auto Basophil % : 0.2 %      Hemoglobin: 12.9 g/dL (09-03-18 @ 08:04)  Bilirubin Total, Serum: 0.4 mg/dL (09-03-18 @ 08:04)  Alanine Aminotransferase (ALT/SGPT): 23 U/L (09-03-18 @ 08:04)  Alkaline Phosphatase, Serum: 42 U/L (09-03-18 @ 08:04)  Bilirubin Direct, Serum: <0.2 mg/dL (09-03-18 @ 08:04)  Aspartate Aminotransferase (AST/SGOT): 19 U/L (09-03-18 @ 08:04)  Hemoglobin: 13.0 g/dL (09-01-18 @ 17:00)  Alanine Aminotransferase (ALT/SGPT): 22 U/L (09-01-18 @ 17:00)  Alkaline Phosphatase, Serum: 48 U/L (09-01-18 @ 17:00)  Bilirubin Total, Serum: 0.3 mg/dL (09-01-18 @ 17:00)  Aspartate Aminotransferase (AST/SGOT): 20 U/L (09-01-18 @ 17:00)          09-03    142  |  102  |  18  ----------------------------<  123<H>  4.8   |  28  |  0.8    Ca    9.6      03 Sep 2018 08:04  Mg     2.6     09-03    TPro  6.5  /  Alb  4.2  /  TBili  0.4  /  DBili  <0.2  /  AST  19  /  ALT  23  /  AlkPhos  42  09-03              RADIOLOGY Patient is a 58y old  Female who presents with a chief complaint of Cough, SOB, Intermittent chest pain     HPI:  This is a 59 YO F, PMH HTN, GERD, former long term smoker (quit 10-15 years ago) who presents with several days of "throat tightness", productive cough, SOB, intermittent chest pain, and posttussive emesis. Interview conducted through daughter in law at bedside as preferred by pt. Upon further questioning she reports her discomfort is more localized to her throat, feeling of restriction when breathing, with phlegm production but coughing only present with deep inhalation. Patient has been dealing with similar issue for many years, has been followed by GI at [facility] and had a Nissen fundoplication but has not had relief. Has been admitted several times over the last few years for similar complaints.     egd 10/2013 for epig discomfort/N/V: s/p Nissen, antritis   colonoscopy 11/2005 dr star mancera for abd pain: adequate prep, mod hemorrhoids     CT abd with ic 1/2018: wnl, post ccy, hep steatosis   Obst series 4/20015: wnl   esophagogram 8/2013: mild esoph dysmotility as tertiary contractions       Vital Signs Last 24 Hrs  T(C): 36.4 (02 Sep 2018 07:22), Max: 36.4 (02 Sep 2018 07:22)  T(F): 97.6 (02 Sep 2018 07:22), Max: 97.6 (02 Sep 2018 07:22)  HR: 60 (02 Sep 2018 07:22) (55 - 75)  BP: 106/57 (02 Sep 2018 07:22) (106/57 - 148/72)  BP(mean): --  RR: 18 (02 Sep 2018 07:22) (18 - 18)  SpO2: 95% (02 Sep 2018 07:22) (95% - 99%) (02 Sep 2018 15:20)          PAST MEDICAL & SURGICAL HISTORY:  GERD (gastroesophageal reflux disease)  CAD (coronary artery disease)  Status post laparoscopic Nissen fundoplication  H/O total hysterectomy  Status post cholecystectomy      Home Medications:  amitriptyline 25 mg oral tablet: 1 tab(s) orally once a day (at bedtime) (02 Sep 2018 17:09)  Aspir 81 oral delayed release tablet: 1 tab(s) orally once a day (02 Sep 2018 17:09)  dicyclomine 20 mg oral tablet: 1 tab(s) orally 4 times a day (02 Sep 2018 17:09)  hydroCHLOROthiazide 12.5 mg oral tablet: 1 tab(s) orally once a day (02 Sep 2018 17:09)  losartan 50 mg oral tablet: 1 tab(s) orally once a day (02 Sep 2018 17:09)  metoprolol succinate 50 mg oral tablet, extended release: 1 tab(s) orally once a day (02 Sep 2018 17:09)  omeprazole 20 mg oral delayed release capsule: 1 cap(s) orally once a day (02 Sep 2018 17:09)      MEDICATIONS  (STANDING):  ALBUTerol/ipratropium for Nebulization 3 milliLiter(s) Nebulizer every 6 hours  amitriptyline 25 milliGRAM(s) Oral at bedtime  aspirin enteric coated 81 milliGRAM(s) Oral daily  atorvastatin 20 milliGRAM(s) Oral at bedtime  dicyclomine 20 milliGRAM(s) Oral four times a day before meals  heparin  Injectable 5000 Unit(s) SubCutaneous every 8 hours  hydrochlorothiazide 12.5 milliGRAM(s) Oral daily  losartan 50 milliGRAM(s) Oral daily  methylPREDNISolone sodium succinate Injectable 60 milliGRAM(s) IV Push daily  metoprolol succinate ER 50 milliGRAM(s) Oral daily  pantoprazole    Tablet 40 milliGRAM(s) Oral before breakfast    MEDICATIONS  (PRN):      Allergies    No Known Allergies    Intolerances        FAMILY HISTORY:  No pertinent family history in first degree relatives      SOCIAL    REVIEW OF SYSTEMS  General: mild fatigue   Skin: no rash   Ophtalmologic: no visual changes   Respiratory: no shortness of breath   Cardiovascular: no chest pain   Gastrointestinal: as per H&P   Genitourinary: no dysuria   Neurological: no weakness   otherwise as described above     Vital Signs Last 24 Hrs  T(C): 35.5 (03 Sep 2018 05:49), Max: 36.3 (02 Sep 2018 15:24)  T(F): 95.9 (03 Sep 2018 05:49), Max: 97.4 (02 Sep 2018 15:24)  HR: 76 (03 Sep 2018 05:49) (70 - 86)  BP: 101/60 (03 Sep 2018 05:49) (101/60 - 117/63)  BP(mean): --  RR: 18 (03 Sep 2018 05:49) (18 - 18)  SpO2: 98% (02 Sep 2018 17:52) (95% - 100%)    GENERAL:  no distress  SKIN: intact   HEENT:  NC/AT,  anicteric  CHEST:   no increased effort, breath sounds clear  HEART:  Regular rhythm  ABDOMEN:  Soft, non-tender, non-distended, normoactive bowel sounds,  no masses ,no hepato-splenomegaly, no signs of chronic liver disease  EXTEREMITIES:  no cyanosis        CBC Full  -  ( 03 Sep 2018 08:04 )  WBC Count : 12.59 K/uL  Hemoglobin : 12.9 g/dL  Hematocrit : 39.9 %  Platelet Count - Automated : 242 K/uL  Mean Cell Volume : 88.1 fL  Mean Cell Hemoglobin : 28.5 pg  Mean Cell Hemoglobin Concentration : 32.3 g/dL  Auto Neutrophil # : 8.40 K/uL  Auto Lymphocyte # : 3.64 K/uL  Auto Monocyte # : 0.43 K/uL  Auto Eosinophil # : 0.05 K/uL  Auto Basophil # : 0.02 K/uL  Auto Neutrophil % : 66.7 %  Auto Lymphocyte % : 28.9 %  Auto Monocyte % : 3.4 %  Auto Eosinophil % : 0.4 %  Auto Basophil % : 0.2 %      Hemoglobin: 12.9 g/dL (09-03-18 @ 08:04)  Bilirubin Total, Serum: 0.4 mg/dL (09-03-18 @ 08:04)  Alanine Aminotransferase (ALT/SGPT): 23 U/L (09-03-18 @ 08:04)  Alkaline Phosphatase, Serum: 42 U/L (09-03-18 @ 08:04)  Bilirubin Direct, Serum: <0.2 mg/dL (09-03-18 @ 08:04)  Aspartate Aminotransferase (AST/SGOT): 19 U/L (09-03-18 @ 08:04)  Hemoglobin: 13.0 g/dL (09-01-18 @ 17:00)  Alanine Aminotransferase (ALT/SGPT): 22 U/L (09-01-18 @ 17:00)  Alkaline Phosphatase, Serum: 48 U/L (09-01-18 @ 17:00)  Bilirubin Total, Serum: 0.3 mg/dL (09-01-18 @ 17:00)  Aspartate Aminotransferase (AST/SGOT): 20 U/L (09-01-18 @ 17:00)          09-03    142  |  102  |  18  ----------------------------<  123<H>  4.8   |  28  |  0.8    Ca    9.6      03 Sep 2018 08:04  Mg     2.6     09-03    TPro  6.5  /  Alb  4.2  /  TBili  0.4  /  DBili  <0.2  /  AST  19  /  ALT  23  /  AlkPhos  42  09-03              RADIOLOGY Patient is a 58y old  Female who presents with a chief complaint of post prandial Cough, SOB, Intermittent chest pain     [place]  [id number]  could not reach daughter in law despite calling 2 x     HPI:  This is a 59 YO F, PMH Nissen fundoplication 2 yrs ago, HTN, GERD, former long term smoker (quit 10-15 years ago) who presents with several days of "throat tightness", productive cough, SOB, intermittent chest pain, and posttussive emesis. The patient notes that these [facility] which she describes as gas pain. After nissen, her abd discomfort improved however her throat tightness / cough worsened, she said she tried to fu with her GI at [facility] but was never able to reach him. She notes that these symptoms occur every time she eats, denies heartburn and abd pain. She notes that her previous PPI used to help but since they changed it, it s not working that well.     GI none  was supposed to get egd post Nissen 2 yrs ago but did not   egd 10/2013 for epig discomfort/N/V: s/p Nissen, antritis   colonoscopy 11/2005 dr star mancera for abd pain: adequate prep, mod hemorrhoids   FH non significant   usually has 1 normal bm per day, no blood     CT abd with ic 1/2018: wnl, post ccy, hep steatosis   Obst series 4/20015: wnl   esophagogram 8/2013: mild esoph dysmotility as tertiary contractions             Vital Signs Last 24 Hrs  T(C): 36.4 (02 Sep 2018 07:22), Max: 36.4 (02 Sep 2018 07:22)  T(F): 97.6 (02 Sep 2018 07:22), Max: 97.6 (02 Sep 2018 07:22)  HR: 60 (02 Sep 2018 07:22) (55 - 75)  BP: 106/57 (02 Sep 2018 07:22) (106/57 - 148/72)  BP(mean): --  RR: 18 (02 Sep 2018 07:22) (18 - 18)  SpO2: 95% (02 Sep 2018 07:22) (95% - 99%) (02 Sep 2018 15:20)          PAST MEDICAL & SURGICAL HISTORY:  GERD (gastroesophageal reflux disease)  CAD (coronary artery disease)  Status post laparoscopic Nissen fundoplication  H/O total hysterectomy  Status post cholecystectomy      Home Medications:  amitriptyline 25 mg oral tablet: 1 tab(s) orally once a day (at bedtime) (02 Sep 2018 17:09)  Aspir 81 oral delayed release tablet: 1 tab(s) orally once a day (02 Sep 2018 17:09)  dicyclomine 20 mg oral tablet: 1 tab(s) orally 4 times a day (02 Sep 2018 17:09)  hydroCHLOROthiazide 12.5 mg oral tablet: 1 tab(s) orally once a day (02 Sep 2018 17:09)  losartan 50 mg oral tablet: 1 tab(s) orally once a day (02 Sep 2018 17:09)  metoprolol succinate 50 mg oral tablet, extended release: 1 tab(s) orally once a day (02 Sep 2018 17:09)  omeprazole 20 mg oral delayed release capsule: 1 cap(s) orally once a day (02 Sep 2018 17:09)      MEDICATIONS  (STANDING):  ALBUTerol/ipratropium for Nebulization 3 milliLiter(s) Nebulizer every 6 hours  amitriptyline 25 milliGRAM(s) Oral at bedtime  aspirin enteric coated 81 milliGRAM(s) Oral daily  atorvastatin 20 milliGRAM(s) Oral at bedtime  dicyclomine 20 milliGRAM(s) Oral four times a day before meals  heparin  Injectable 5000 Unit(s) SubCutaneous every 8 hours  hydrochlorothiazide 12.5 milliGRAM(s) Oral daily  losartan 50 milliGRAM(s) Oral daily  methylPREDNISolone sodium succinate Injectable 60 milliGRAM(s) IV Push daily  metoprolol succinate ER 50 milliGRAM(s) Oral daily  pantoprazole    Tablet 40 milliGRAM(s) Oral before breakfast    MEDICATIONS  (PRN):      Allergies    No Known Allergies    Intolerances        FAMILY HISTORY:  No pertinent family history in first degree relatives      SOCIAL    REVIEW OF SYSTEMS  General: mild fatigue   Skin: no rash   Ophtalmologic: no visual changes   Respiratory: no shortness of breath   Cardiovascular: no chest pain   Gastrointestinal: as per H&P   Genitourinary: no dysuria   Neurological: no weakness   otherwise as described above     Vital Signs Last 24 Hrs  T(C): 35.5 (03 Sep 2018 05:49), Max: 36.3 (02 Sep 2018 15:24)  T(F): 95.9 (03 Sep 2018 05:49), Max: 97.4 (02 Sep 2018 15:24)  HR: 76 (03 Sep 2018 05:49) (70 - 86)  BP: 101/60 (03 Sep 2018 05:49) (101/60 - 117/63)  BP(mean): --  RR: 18 (03 Sep 2018 05:49) (18 - 18)  SpO2: 98% (02 Sep 2018 17:52) (95% - 100%)    GENERAL:  no distress  SKIN: intact   HEENT:  NC/AT,  anicteric  CHEST:   no increased effort, breath sounds clear  HEART:  Regular rhythm  ABDOMEN:  Soft, non-tender, non-distended, normoactive bowel sounds,  no masses ,no hepato-splenomegaly, no signs of chronic liver disease  EXTEREMITIES:  no cyanosis        CBC Full  -  ( 03 Sep 2018 08:04 )  WBC Count : 12.59 K/uL  Hemoglobin : 12.9 g/dL  Hematocrit : 39.9 %  Platelet Count - Automated : 242 K/uL  Mean Cell Volume : 88.1 fL  Mean Cell Hemoglobin : 28.5 pg  Mean Cell Hemoglobin Concentration : 32.3 g/dL  Auto Neutrophil # : 8.40 K/uL  Auto Lymphocyte # : 3.64 K/uL  Auto Monocyte # : 0.43 K/uL  Auto Eosinophil # : 0.05 K/uL  Auto Basophil # : 0.02 K/uL  Auto Neutrophil % : 66.7 %  Auto Lymphocyte % : 28.9 %  Auto Monocyte % : 3.4 %  Auto Eosinophil % : 0.4 %  Auto Basophil % : 0.2 %      Hemoglobin: 12.9 g/dL (09-03-18 @ 08:04)  Bilirubin Total, Serum: 0.4 mg/dL (09-03-18 @ 08:04)  Alanine Aminotransferase (ALT/SGPT): 23 U/L (09-03-18 @ 08:04)  Alkaline Phosphatase, Serum: 42 U/L (09-03-18 @ 08:04)  Bilirubin Direct, Serum: <0.2 mg/dL (09-03-18 @ 08:04)  Aspartate Aminotransferase (AST/SGOT): 19 U/L (09-03-18 @ 08:04)  Hemoglobin: 13.0 g/dL (09-01-18 @ 17:00)  Alanine Aminotransferase (ALT/SGPT): 22 U/L (09-01-18 @ 17:00)  Alkaline Phosphatase, Serum: 48 U/L (09-01-18 @ 17:00)  Bilirubin Total, Serum: 0.3 mg/dL (09-01-18 @ 17:00)  Aspartate Aminotransferase (AST/SGOT): 20 U/L (09-01-18 @ 17:00)          09-03    142  |  102  |  18  ----------------------------<  123<H>  4.8   |  28  |  0.8    Ca    9.6      03 Sep 2018 08:04  Mg     2.6     09-03    TPro  6.5  /  Alb  4.2  /  TBili  0.4  /  DBili  <0.2  /  AST  19  /  ALT  23  /  AlkPhos  42  09-03              RADIOLOGY

## 2018-09-04 LAB
ALBUMIN SERPL ELPH-MCNC: 4.4 G/DL — SIGNIFICANT CHANGE UP (ref 3.5–5.2)
ALP SERPL-CCNC: 44 U/L — SIGNIFICANT CHANGE UP (ref 30–115)
ALT FLD-CCNC: 29 U/L — SIGNIFICANT CHANGE UP (ref 0–41)
ANION GAP SERPL CALC-SCNC: 14 MMOL/L — SIGNIFICANT CHANGE UP (ref 7–14)
AST SERPL-CCNC: 19 U/L — SIGNIFICANT CHANGE UP (ref 0–41)
BASOPHILS # BLD AUTO: 0.01 K/UL — SIGNIFICANT CHANGE UP (ref 0–0.2)
BASOPHILS NFR BLD AUTO: 0.1 % — SIGNIFICANT CHANGE UP (ref 0–1)
BILIRUB DIRECT SERPL-MCNC: <0.2 MG/DL — SIGNIFICANT CHANGE UP (ref 0–0.2)
BILIRUB INDIRECT FLD-MCNC: >0.2 MG/DL — SIGNIFICANT CHANGE UP (ref 0.2–1.2)
BILIRUB SERPL-MCNC: 0.4 MG/DL — SIGNIFICANT CHANGE UP (ref 0.2–1.2)
BUN SERPL-MCNC: 16 MG/DL — SIGNIFICANT CHANGE UP (ref 10–20)
CALCIUM SERPL-MCNC: 9.6 MG/DL — SIGNIFICANT CHANGE UP (ref 8.5–10.1)
CHLORIDE SERPL-SCNC: 102 MMOL/L — SIGNIFICANT CHANGE UP (ref 98–110)
CO2 SERPL-SCNC: 27 MMOL/L — SIGNIFICANT CHANGE UP (ref 17–32)
CREAT SERPL-MCNC: 0.7 MG/DL — SIGNIFICANT CHANGE UP (ref 0.7–1.5)
EOSINOPHIL # BLD AUTO: 0.02 K/UL — SIGNIFICANT CHANGE UP (ref 0–0.7)
EOSINOPHIL NFR BLD AUTO: 0.2 % — SIGNIFICANT CHANGE UP (ref 0–8)
GLUCOSE SERPL-MCNC: 126 MG/DL — HIGH (ref 70–99)
HCT VFR BLD CALC: 39.4 % — SIGNIFICANT CHANGE UP (ref 37–47)
HGB BLD-MCNC: 12.9 G/DL — SIGNIFICANT CHANGE UP (ref 12–16)
IMM GRANULOCYTES NFR BLD AUTO: 0.7 % — HIGH (ref 0.1–0.3)
LYMPHOCYTES # BLD AUTO: 2.51 K/UL — SIGNIFICANT CHANGE UP (ref 1.2–3.4)
LYMPHOCYTES # BLD AUTO: 21.5 % — SIGNIFICANT CHANGE UP (ref 20.5–51.1)
MAGNESIUM SERPL-MCNC: 2.6 MG/DL — HIGH (ref 1.8–2.4)
MCHC RBC-ENTMCNC: 28.9 PG — SIGNIFICANT CHANGE UP (ref 27–31)
MCHC RBC-ENTMCNC: 32.7 G/DL — SIGNIFICANT CHANGE UP (ref 32–37)
MCV RBC AUTO: 88.1 FL — SIGNIFICANT CHANGE UP (ref 81–99)
MONOCYTES # BLD AUTO: 0.31 K/UL — SIGNIFICANT CHANGE UP (ref 0.1–0.6)
MONOCYTES NFR BLD AUTO: 2.6 % — SIGNIFICANT CHANGE UP (ref 1.7–9.3)
NEUTROPHILS # BLD AUTO: 8.77 K/UL — HIGH (ref 1.4–6.5)
NEUTROPHILS NFR BLD AUTO: 74.9 % — SIGNIFICANT CHANGE UP (ref 42.2–75.2)
NRBC # BLD: 0 /100 WBCS — SIGNIFICANT CHANGE UP (ref 0–0)
PHOSPHATE SERPL-MCNC: 1.7 MG/DL — LOW (ref 2.1–4.9)
PLATELET # BLD AUTO: 249 K/UL — SIGNIFICANT CHANGE UP (ref 130–400)
POTASSIUM SERPL-MCNC: 4.4 MMOL/L — SIGNIFICANT CHANGE UP (ref 3.5–5)
POTASSIUM SERPL-SCNC: 4.4 MMOL/L — SIGNIFICANT CHANGE UP (ref 3.5–5)
PROT SERPL-MCNC: 6.5 G/DL — SIGNIFICANT CHANGE UP (ref 6–8)
RBC # BLD: 4.47 M/UL — SIGNIFICANT CHANGE UP (ref 4.2–5.4)
RBC # FLD: 13.9 % — SIGNIFICANT CHANGE UP (ref 11.5–14.5)
SODIUM SERPL-SCNC: 143 MMOL/L — SIGNIFICANT CHANGE UP (ref 135–146)
WBC # BLD: 11.7 K/UL — HIGH (ref 4.8–10.8)
WBC # FLD AUTO: 11.7 K/UL — HIGH (ref 4.8–10.8)

## 2018-09-04 RX ORDER — SODIUM,POTASSIUM PHOSPHATES 278-250MG
1 POWDER IN PACKET (EA) ORAL ONCE
Qty: 0 | Refills: 0 | Status: COMPLETED | OUTPATIENT
Start: 2018-09-04 | End: 2018-09-04

## 2018-09-04 RX ADMIN — HEPARIN SODIUM 5000 UNIT(S): 5000 INJECTION INTRAVENOUS; SUBCUTANEOUS at 21:21

## 2018-09-04 RX ADMIN — Medication 81 MILLIGRAM(S): at 11:22

## 2018-09-04 RX ADMIN — Medication 20 MILLIGRAM(S): at 05:38

## 2018-09-04 RX ADMIN — Medication 20 MILLIGRAM(S): at 16:55

## 2018-09-04 RX ADMIN — Medication 50 MILLIGRAM(S): at 05:38

## 2018-09-04 RX ADMIN — Medication 3 MILLILITER(S): at 07:25

## 2018-09-04 RX ADMIN — ATORVASTATIN CALCIUM 20 MILLIGRAM(S): 80 TABLET, FILM COATED ORAL at 21:21

## 2018-09-04 RX ADMIN — Medication 20 MILLIGRAM(S): at 11:22

## 2018-09-04 RX ADMIN — Medication 1 PACKET(S): at 18:08

## 2018-09-04 RX ADMIN — HEPARIN SODIUM 5000 UNIT(S): 5000 INJECTION INTRAVENOUS; SUBCUTANEOUS at 13:02

## 2018-09-04 RX ADMIN — Medication 60 MILLIGRAM(S): at 05:39

## 2018-09-04 RX ADMIN — Medication 12.5 MILLIGRAM(S): at 05:38

## 2018-09-04 RX ADMIN — PANTOPRAZOLE SODIUM 40 MILLIGRAM(S): 20 TABLET, DELAYED RELEASE ORAL at 05:40

## 2018-09-04 RX ADMIN — Medication 3 MILLILITER(S): at 13:38

## 2018-09-04 RX ADMIN — LOSARTAN POTASSIUM 50 MILLIGRAM(S): 100 TABLET, FILM COATED ORAL at 05:39

## 2018-09-04 RX ADMIN — Medication 20 MILLIGRAM(S): at 21:21

## 2018-09-04 RX ADMIN — Medication 25 MILLIGRAM(S): at 21:21

## 2018-09-04 NOTE — PROGRESS NOTE ADULT - ASSESSMENT
57 YO F with PMH HTN, GERD presents with 2 days of cough, SOB, posttussive emesis, and chronic complaints of throat tightening, difficulty swallowing, regurgitation. CXR unremarkable, labs unremarkable. Has undergone extensive workup for these complaints including EGD, barium swallow, nissen fundoplication, CT scans with no etiology elucidated.     1) Dyspepsia/GERD/Regurgitation  - No findings on physical exam  - c/w PPI to 40mg BID	  - continue bentyl, amitriptyline   - appreciate GI consult:               - r/o uncontrolled reflux              - elevate HOB               - aspiration precautions               - lifestyle modifications for GERD              - consider speech and swallow eval               - check esophagogram               - get records from [place]               - will consider EGD               - PPI q12h               - Patient does not wish to stay for esophagram and GI workup: please refer to [facility] for followup    2) Cough, SOB: likely undiagnosed COPD  - continue with nebulizer treatments  - O2 prn   - continue solumedrol iv 60mg daily  - PFT set pending   - Pulm consult pending    3) HTN  - continue home meds: losartan, HCTZ, metoprolol, asa    4) Ppx, diet, dispo  - SQ heparin  - DASH diet  - Full code, from home
57 YO F with PMH HTN, GERD presents with 2 days of cough, SOB, posttussive emesis, and chronic complaints of throat tightening, difficulty swallowing, regurgitation. CXR unremarkable, labs unremarkable. Has undergone extensive workup for these complaints including EGD, barium swallow, nissen fundoplication, CT scans with no etiology elucidated.     1) Dyspepsia/GERD/Regurgitation  - No findings on physical exam  - c/w PPI to 40mg BID  - GI consult pending   - continue bentyl, amitriptyline     2) Cough, SOB: likely undiagnosed COPD  - continue with nebulizer treatments  - O2 prn   - continue solumedrol iv 60mg daily  - PFT set pending   - consider pulm consult    3) HTN  - continue home meds: losartan, HCTZ, metoprolol, asa    4) Ppx, diet, dispo  - SQ heparin  - DASH diet  - Full code, from home
no

## 2018-09-04 NOTE — PROGRESS NOTE ADULT - SUBJECTIVE AND OBJECTIVE BOX
Patient was seen and examined. Spoke with RN. Chart reviewed.  No events overnight.  Vital Signs Last 24 Hrs  T(F): 97.3 (04 Sep 2018 05:00), Max: 97.3 (04 Sep 2018 05:00)  HR: 65 (04 Sep 2018 05:00) (65 - 93)  BP: 150/69 (04 Sep 2018 05:00) (115/65 - 150/69)  SpO2: 97% (04 Sep 2018 07:30) (97% - 97%)  MEDICATIONS  (STANDING):  ALBUTerol/ipratropium for Nebulization 3 milliLiter(s) Nebulizer every 6 hours  amitriptyline 25 milliGRAM(s) Oral at bedtime  aspirin enteric coated 81 milliGRAM(s) Oral daily  atorvastatin 20 milliGRAM(s) Oral at bedtime  dicyclomine 20 milliGRAM(s) Oral four times a day before meals  heparin  Injectable 5000 Unit(s) SubCutaneous every 8 hours  hydrochlorothiazide 12.5 milliGRAM(s) Oral daily  losartan 50 milliGRAM(s) Oral daily  methylPREDNISolone sodium succinate Injectable 60 milliGRAM(s) IV Push daily  metoprolol succinate ER 50 milliGRAM(s) Oral daily  pantoprazole    Tablet 40 milliGRAM(s) Oral before breakfast    MEDICATIONS  (PRN):    Labs:                        12.9   12.59 )-----------( 242      ( 03 Sep 2018 08:04 )             39.9     03 Sep 2018 08:04    142    |  102    |  18     ----------------------------<  123    4.8     |  28     |  0.8      Ca    9.6        03 Sep 2018 08:04  Mg     2.6       03 Sep 2018 08:04    TPro  6.5    /  Alb  4.2    /  TBili  0.4    /  DBili  <0.2   /  AST  19     /  ALT  23     /  AlkPhos  42     03 Sep 2018 08:04          General: comfortable, NAD  Neurology: A&Ox3, nonfocal  Head:  Normocephalic, atraumatic  ENT:  Mucosa moist, no ulcerations  Neck:  Supple, no JVD,   Skin: no breakdowns (as per RN)  Resp: CTA B/L  CV: RRR, S1S2,   GI: Soft, NT, bowel sounds  MS: No edema, + peripheral pulses, FROM all 4 extremity      A/P:  59 YO F with PMH HTN, GERD presents with 2 days of cough, SOB, posttussive emesis, and chronic complaints of throat tightening, difficulty swallowing, regurgitation. CXR unremarkable, labs unremarkable. Has undergone extensive workup for these complaints including EGD, barium swallow, nissen fundoplication, CT scans with no etiology elucidated.     1) Dyspepsia/GERD/Regurgitation  - No findings on physical exam  - c/w PPI to 40mg BID  - GI f/u as outpt  - continue bentyl, amitriptyline     2) Cough, SOB:   - continue with nebulizer treatments  - O2 prn   - continue solumedrol iv 60mg daily- taper tomorrow  - PFT set pending   -  pulm consult- ISAEL Garibay    DVT prophylaxis  Decubitus prevention- all measures as per RN protocol  Please call or text me with any questions or updates

## 2018-09-04 NOTE — PROGRESS NOTE ADULT - SUBJECTIVE AND OBJECTIVE BOX
SUBJECTIVE:    Patient is a 58y old Female who presents with a chief complaint of Cough, SOB, Intermittent chest pain (02 Sep 2018 15:20) Currently admitted to medicine with the primary diagnosis of COPD exacerbation.      Today is hospital day 2d. This morning she is resting comfortably in bed and reports no new issues or overnight events. Patient reports that she still has some throat pain/difficulty swallowing. Patient receiving nebulizer treatments and states her breathing is better following these. Patient reports that she would like to go home and follow up as an outpatient. Patient denies CP/N/V/fevers/chills and GI/ symptoms.     Spoke with patient's daughter in law. She states that the patient takes Amitriptyline @ night to help with her tense muscles to get relax and get sleep at night.         PAST MEDICAL & SURGICAL HISTORY  GERD (gastroesophageal reflux disease)  CAD (coronary artery disease)  Status post laparoscopic Nissen fundoplication  H/O total hysterectomy  Status post cholecystectomy    SOCIAL HISTORY:  Negative for smoking/alcohol/drug use.     ALLERGIES:  No Known Allergies    MEDICATIONS:  STANDING MEDICATIONS  ALBUTerol/ipratropium for Nebulization 3 milliLiter(s) Nebulizer every 6 hours  amitriptyline 25 milliGRAM(s) Oral at bedtime  aspirin enteric coated 81 milliGRAM(s) Oral daily  atorvastatin 20 milliGRAM(s) Oral at bedtime  dicyclomine 20 milliGRAM(s) Oral four times a day before meals  heparin  Injectable 5000 Unit(s) SubCutaneous every 8 hours  hydrochlorothiazide 12.5 milliGRAM(s) Oral daily  losartan 50 milliGRAM(s) Oral daily  methylPREDNISolone sodium succinate Injectable 60 milliGRAM(s) IV Push daily  metoprolol succinate ER 50 milliGRAM(s) Oral daily  pantoprazole    Tablet 40 milliGRAM(s) Oral before breakfast  potassium phosphate / sodium phosphate powder 1 Packet(s) Oral once    PRN MEDICATIONS    VITALS:   T(F): 97.3  HR: 65  BP: 150/69  RR: 18  SpO2: 97%    LABS:                        12.9   11.70 )-----------( 249      ( 04 Sep 2018 08:19 )             39.4     09-04    143  |  102  |  16  ----------------------------<  126<H>  4.4   |  27  |  0.7    Ca    9.6      04 Sep 2018 08:19  Phos  1.7     09-04  Mg     2.6     09-04    TPro  6.5  /  Alb  4.4  /  TBili  0.4  /  DBili  <0.2  /  AST  19  /  ALT  29  /  AlkPhos  44  09-04                  RADIOLOGY:  < from: Xray Chest 1 View- PORTABLE-Urgent (09.01.18 @ 17:04) >  No radiographic evidence of acute cardiopulmonary disease.    < end of copied text >      PHYSICAL EXAM:  GEN: No acute distress  LUNGS: Clear to auscultation bilaterally, decreased BS b/l   HEART: S1/S2 present. RRR.   ABD: Soft, non-tender, non-distended. Bowel sounds present  EXT: NC/NC/NE/2+PP/HILL/Skin Intact.   NEURO: AAOX3

## 2018-09-05 ENCOUNTER — TRANSCRIPTION ENCOUNTER (OUTPATIENT)
Age: 58
End: 2018-09-05

## 2018-09-05 VITALS
DIASTOLIC BLOOD PRESSURE: 78 MMHG | SYSTOLIC BLOOD PRESSURE: 156 MMHG | HEART RATE: 54 BPM | RESPIRATION RATE: 18 BRPM | TEMPERATURE: 96 F

## 2018-09-05 LAB
ALBUMIN SERPL ELPH-MCNC: 4.2 G/DL — SIGNIFICANT CHANGE UP (ref 3.5–5.2)
ALP SERPL-CCNC: 46 U/L — SIGNIFICANT CHANGE UP (ref 30–115)
ALT FLD-CCNC: 28 U/L — SIGNIFICANT CHANGE UP (ref 0–41)
ANION GAP SERPL CALC-SCNC: 19 MMOL/L — HIGH (ref 7–14)
AST SERPL-CCNC: 16 U/L — SIGNIFICANT CHANGE UP (ref 0–41)
BILIRUB SERPL-MCNC: 0.4 MG/DL — SIGNIFICANT CHANGE UP (ref 0.2–1.2)
BUN SERPL-MCNC: 18 MG/DL — SIGNIFICANT CHANGE UP (ref 10–20)
CALCIUM SERPL-MCNC: 9.8 MG/DL — SIGNIFICANT CHANGE UP (ref 8.5–10.1)
CHLORIDE SERPL-SCNC: 98 MMOL/L — SIGNIFICANT CHANGE UP (ref 98–110)
CO2 SERPL-SCNC: 24 MMOL/L — SIGNIFICANT CHANGE UP (ref 17–32)
CREAT SERPL-MCNC: 0.8 MG/DL — SIGNIFICANT CHANGE UP (ref 0.7–1.5)
GLUCOSE SERPL-MCNC: 201 MG/DL — HIGH (ref 70–99)
HCT VFR BLD CALC: 40 % — SIGNIFICANT CHANGE UP (ref 37–47)
HGB BLD-MCNC: 12.9 G/DL — SIGNIFICANT CHANGE UP (ref 12–16)
MAGNESIUM SERPL-MCNC: 2.2 MG/DL — SIGNIFICANT CHANGE UP (ref 1.8–2.4)
MCHC RBC-ENTMCNC: 28.4 PG — SIGNIFICANT CHANGE UP (ref 27–31)
MCHC RBC-ENTMCNC: 32.3 G/DL — SIGNIFICANT CHANGE UP (ref 32–37)
MCV RBC AUTO: 88.1 FL — SIGNIFICANT CHANGE UP (ref 81–99)
NRBC # BLD: 0 /100 WBCS — SIGNIFICANT CHANGE UP (ref 0–0)
PHOSPHATE SERPL-MCNC: 2.5 MG/DL — SIGNIFICANT CHANGE UP (ref 2.1–4.9)
PLATELET # BLD AUTO: 249 K/UL — SIGNIFICANT CHANGE UP (ref 130–400)
POTASSIUM SERPL-MCNC: 4.2 MMOL/L — SIGNIFICANT CHANGE UP (ref 3.5–5)
POTASSIUM SERPL-SCNC: 4.2 MMOL/L — SIGNIFICANT CHANGE UP (ref 3.5–5)
PROT SERPL-MCNC: 6.2 G/DL — SIGNIFICANT CHANGE UP (ref 6–8)
RBC # BLD: 4.54 M/UL — SIGNIFICANT CHANGE UP (ref 4.2–5.4)
RBC # FLD: 13.9 % — SIGNIFICANT CHANGE UP (ref 11.5–14.5)
SODIUM SERPL-SCNC: 141 MMOL/L — SIGNIFICANT CHANGE UP (ref 135–146)
WBC # BLD: 9.89 K/UL — SIGNIFICANT CHANGE UP (ref 4.8–10.8)
WBC # FLD AUTO: 9.89 K/UL — SIGNIFICANT CHANGE UP (ref 4.8–10.8)

## 2018-09-05 RX ORDER — PANTOPRAZOLE SODIUM 20 MG/1
1 TABLET, DELAYED RELEASE ORAL
Qty: 14 | Refills: 0 | OUTPATIENT
Start: 2018-09-05 | End: 2018-09-18

## 2018-09-05 RX ADMIN — Medication 3 MILLILITER(S): at 07:32

## 2018-09-05 RX ADMIN — LOSARTAN POTASSIUM 50 MILLIGRAM(S): 100 TABLET, FILM COATED ORAL at 06:02

## 2018-09-05 RX ADMIN — Medication 50 MILLIGRAM(S): at 06:02

## 2018-09-05 RX ADMIN — Medication 12.5 MILLIGRAM(S): at 06:02

## 2018-09-05 RX ADMIN — Medication 60 MILLIGRAM(S): at 06:01

## 2018-09-05 RX ADMIN — HEPARIN SODIUM 5000 UNIT(S): 5000 INJECTION INTRAVENOUS; SUBCUTANEOUS at 06:02

## 2018-09-05 RX ADMIN — Medication 20 MILLIGRAM(S): at 06:02

## 2018-09-05 RX ADMIN — PANTOPRAZOLE SODIUM 40 MILLIGRAM(S): 20 TABLET, DELAYED RELEASE ORAL at 06:02

## 2018-09-05 RX ADMIN — Medication 81 MILLIGRAM(S): at 12:03

## 2018-09-05 RX ADMIN — Medication 20 MILLIGRAM(S): at 12:03

## 2018-09-05 NOTE — SWALLOW BEDSIDE ASSESSMENT ADULT - SLP PERTINENT HISTORY OF CURRENT PROBLEM
Pt admitted with cough, SOB. COPD exacerbation. Pt reports food "getting stuck" and pointing to sternal region, frequent belching during meals. Pt with recent EGD and esophogram WFL. PMHx: GERD, nissen fundoplication procedure

## 2018-09-05 NOTE — SWALLOW BEDSIDE ASSESSMENT ADULT - SWALLOW EVAL: DIAGNOSIS
No s/s aspiration/penetration for thin liquids, puree, mechanical soft; pt reports globus sensation with puree and mechanical soft

## 2018-09-05 NOTE — DISCHARGE NOTE ADULT - HOSPITAL COURSE
This is a 59 YO F, PMH Nissen fundoplication 2 yrs ago, HTN, GERD, former long term smoker (quit 10-15 years ago) who presents with several days of "throat tightness", productive cough, SOB, intermittent chest pain, and posttussive emesis. The patient notes that these symptoms were present before her Nissen in addition to abd discomfort which she describes as gas pain. After nissen, her abd discomfort improved however her throat tightness / cough worsened, she said she tried to fu with her GI at [place] but was never able to reach him. She notes that these symptoms occur every time she eats, denies heartburn and abd pain. She notes that her previous PPI used to help but since they changed it, it s not working that well.     Patient had a chest x-ray that showed No radiographic evidence of acute cardiopulmonary disease. GI team came to evaluate the patient to r/o uncontrolled reflux. Patient does not wish to stay for esophagram and GI workup: please refer to [facility] GI clinic for followup. Speech and swallow came to evaluate patient and suggested that this was all esophagus related and she would benefit from further GI workup. Pulmonary consult was called as well and recommended the same things. Patient received nebulizer treatments and IV steroids while in the hospital. Patient's breathing symptoms greatly improved and she only endorses SOB following eating. Patient and family agree with treatment plan and to have patient receive further workup as an outpatient. Patient is medically stable for discharge.

## 2018-09-05 NOTE — DISCHARGE NOTE ADULT - CARE PROVIDER_API CALL
Eddie Perez), Internal Medicine  [address] [place] [address]  Phone: [phone]  Fax: [phone]    [facility],   [address] [place] [id number]  [phone]  Phone: (   )    -  Fax: (   )    -

## 2018-09-05 NOTE — DISCHARGE NOTE ADULT - ADDITIONAL INSTRUCTIONS
Please contact the [facility] [phone]) to schedule an appointment with GI doctors for further workup.

## 2018-09-05 NOTE — PROGRESS NOTE ADULT - SUBJECTIVE AND OBJECTIVE BOX
Patient was seen and examined. Spoke with RN. Chart reviewed.  No events overnight. Much improved  Vital Signs Last 24 Hrs  T(F): 96.1 (05 Sep 2018 04:44), Max: 98.3 (04 Sep 2018 21:26)  HR: 54 (05 Sep 2018 04:44) (54 - 86)  BP: 156/78 (05 Sep 2018 04:44) (117/60 - 156/78)  SpO2: --  MEDICATIONS  (STANDING):  ALBUTerol/ipratropium for Nebulization 3 milliLiter(s) Nebulizer every 6 hours  amitriptyline 25 milliGRAM(s) Oral at bedtime  aspirin enteric coated 81 milliGRAM(s) Oral daily  atorvastatin 20 milliGRAM(s) Oral at bedtime  dicyclomine 20 milliGRAM(s) Oral four times a day before meals  heparin  Injectable 5000 Unit(s) SubCutaneous every 8 hours  hydrochlorothiazide 12.5 milliGRAM(s) Oral daily  losartan 50 milliGRAM(s) Oral daily  methylPREDNISolone sodium succinate Injectable 60 milliGRAM(s) IV Push daily  metoprolol succinate ER 50 milliGRAM(s) Oral daily  pantoprazole    Tablet 40 milliGRAM(s) Oral before breakfast    MEDICATIONS  (PRN):    Labs:                        12.9   11.70 )-----------( 249      ( 04 Sep 2018 08:19 )             39.4     04 Sep 2018 08:19    143    |  102    |  16     ----------------------------<  126    4.4     |  27     |  0.7      Ca    9.6        04 Sep 2018 08:19  Phos  1.7       04 Sep 2018 08:19  Mg     2.6       04 Sep 2018 08:19    TPro  6.5    /  Alb  4.4    /  TBili  0.4    /  DBili  <0.2   /  AST  19     /  ALT  29     /  AlkPhos  44     04 Sep 2018 08:19          General: comfortable, NAD  Neurology: A&Ox3, nonfocal  Head:  Normocephalic, atraumatic  ENT:  Mucosa moist, no ulcerations  Neck:  Supple, no JVD,   Skin: no breakdowns (as per RN)  Resp: CTA B/L  CV: RRR, S1S2,   GI: Soft, NT, bowel sounds  MS: No edema, + peripheral pulses, FROM all 4 extremity      A/P:  57 YO F with PMH HTN, GERD presents with 2 days of cough, SOB, posttussive emesis, and chronic complaints of throat tightening, difficulty swallowing, regurgitation. CXR unremarkable, labs unremarkable. Has undergone extensive workup for these complaints including EGD, barium swallow, nissen fundoplication, CT scans with no etiology elucidated.     1) Dyspepsia/GERD/Regurgitation  - No findings on physical exam  - c/w PPI to 40mg BID  - GI f/u as outpt  - continue bentyl, amitriptyline     2) Cough, SOB:   - continue with nebulizer treatments  - O2 prn   - change steroids to PO prednisone  - PFT set pending   -  pulm consult- Dr Ashley Garibay    POssible DC today if cleared by pulm    DVT prophylaxis  Decubitus prevention- all measures as per RN protocol  Please call or text me with any questions or updates

## 2018-09-05 NOTE — DISCHARGE NOTE ADULT - PATIENT PORTAL LINK FT
You can access the [facility], offered by [facility], by registering with the following website: [email or web address]

## 2018-09-05 NOTE — DISCHARGE NOTE ADULT - PLAN OF CARE
monitor Please continue to take prescribed medications as scheduled. Please follow up with primary care physician and pulmonary physician within two weeks of discharge. Please continue to take prescribed medications as scheduled. Please follow up with primary care physician and gastroenterology physician within two weeks of discharge.

## 2018-09-05 NOTE — CONSULT NOTE ADULT - ASSESSMENT
59 y/o female with tobacco history in remission admitted with complaints cough worse after eating associated with dysphagia and shortness of breath    -airtrapping on ct chest  -suspected copd without exacerbation  -albuterol prn  -d/c iv steroids  -pfts as out pt  -gi follow up  -dvt px  -d/w primary team and family

## 2018-09-05 NOTE — DISCHARGE NOTE ADULT - MEDICATION SUMMARY - MEDICATIONS TO TAKE
I will START or STAY ON the medications listed below when I get home from the hospital:    Aspir 81 oral delayed release tablet  -- 1 tab(s) by mouth once a day  -- Indication: For pain     losartan 50 mg oral tablet  -- 1 tab(s) by mouth once a day  -- Indication: For blood pressure     amitriptyline 25 mg oral tablet  -- 1 tab(s) by mouth once a day (at bedtime)  -- Indication: For neuropathic pain     metoprolol succinate 50 mg oral tablet, extended release  -- 1 tab(s) by mouth once a day  -- Indication: For blood pressure     hydroCHLOROthiazide 12.5 mg oral tablet  -- 1 tab(s) by mouth once a day  -- Indication: For blood pressure     dicyclomine 20 mg oral tablet  -- 1 tab(s) by mouth 4 times a day  -- Indication: For COnstipation     omeprazole 20 mg oral delayed release capsule  -- 1 cap(s) by mouth once a day  -- Indication: For GERD (gastroesophageal reflux disease)

## 2018-09-05 NOTE — DISCHARGE NOTE ADULT - PROVIDER TOKENS
TOKEN:'[id number]',FREE:[LAST:[[facility]],PHONE:[(   )    -],FAX:[(   )    -],ADDRESS:[[address] [place], [place] [address] [phone]]]

## 2018-09-05 NOTE — DISCHARGE NOTE ADULT - CARE PLAN
Principal Discharge DX:	COPD exacerbation  Goal:	monitor  Assessment and plan of treatment:	Please continue to take prescribed medications as scheduled. Please follow up with primary care physician and pulmonary physician within two weeks of discharge.  Secondary Diagnosis:	GERD (gastroesophageal reflux disease)  Goal:	monitor  Assessment and plan of treatment:	Please continue to take prescribed medications as scheduled. Please follow up with primary care physician and gastroenterology physician within two weeks of discharge. Principal Discharge DX:	GERD (gastroesophageal reflux disease)  Goal:	monitor  Assessment and plan of treatment:	Please continue to take prescribed medications as scheduled. Please follow up with primary care physician and gastroenterology physician within two weeks of discharge.

## 2018-09-05 NOTE — SWALLOW BEDSIDE ASSESSMENT ADULT - COMMENTS
+ toleration, frequent belching following trials + toleration, frequent belching following trials. Reports globus sensation

## 2018-09-05 NOTE — CONSULT NOTE ADULT - SUBJECTIVE AND OBJECTIVE BOX
MARTY SORIANO  MRN-[id number]    HISTORY OF PRESENT ILLNESS:    57 YO F, PMH HTN, GERD, former long term smoker (quit 10-15 years ago) who presents with several days of "throat tightness", productive cough, SOB, intermittent chest pain, and posttussive emesis. Interview conducted through daughter in law at bedside as preferred by pt. She denies fevers, chills, abdominal pain, nausea, diarrhea, LE edema, palpitations. Was initially admitted to OBS but when testing ambulatory oxygen saturation she remained at 97% on RA but reported dyspnea/SOB and so she was referred for admission. Upon further questioning she reports her discomfort is more localized to her throat, feeling of restriction when breathing, with phlegm production but coughing only present with deep inhalation. Patient has been dealing with similar issue for many years, has been followed by GI at [facility] and had a Nissen fundoplication but has not had relief. Has been admitted several times over the last few years for similar complaints.   Discussed with patients daughter at bedside  pt states shortness of breath occurs only with difficulty in swallowing. no sputum production, no fever, occasional cough after eating    Vital Signs Last 24 Hrs  T(C): 36.4 (02 Sep 2018 07:22), Max: 36.4 (02 Sep 2018 07:22)  T(F): 97.6 (02 Sep 2018 07:22), Max: 97.6 (02 Sep 2018 07:22)  HR: 60 (02 Sep 2018 07:22) (55 - 75)  BP: 106/57 (02 Sep 2018 07:22) (106/57 - 148/72)  BP(mean): --  RR: 18 (02 Sep 2018 07:22) (18 - 18)  SpO2: 95% (02 Sep 2018 07:22) (95% - 99%) (02 Sep 2018 15:20)      PMH/PSH:  PAST MEDICAL & SURGICAL HISTORY:  GERD (gastroesophageal reflux disease)  CAD (coronary artery disease)  Status post laparoscopic Nissen fundoplication  H/O total hysterectomy  Status post cholecystectomy    ALLERGIES:  Allergies    No Known Allergies    Intolerances      SOCIAL HABITS:  ex smoker 15 pack years total    FAMILY HISTORY:   FAMILY HISTORY:  No pertinent family history in first degree relatives      REVIEW OF SYSTEM:  Elements of review of systems are negative or non-applicable except as noted above in HPI section.       HOME MEDICATIONS:  amitriptyline 25 mg oral tablet  Aspir 81 oral delayed release tablet  dicyclomine 20 mg oral tablet  hydroCHLOROthiazide 12.5 mg oral tablet  losartan 50 mg oral tablet  metoprolol succinate 50 mg oral tablet, extended release  omeprazole 20 mg oral delayed release capsule    MEDICATIONS:  MEDICATIONS  (STANDING):  ALBUTerol/ipratropium for Nebulization 3 milliLiter(s) Nebulizer every 6 hours  amitriptyline 25 milliGRAM(s) Oral at bedtime  aspirin enteric coated 81 milliGRAM(s) Oral daily  atorvastatin 20 milliGRAM(s) Oral at bedtime  dicyclomine 20 milliGRAM(s) Oral four times a day before meals  heparin  Injectable 5000 Unit(s) SubCutaneous every 8 hours  hydrochlorothiazide 12.5 milliGRAM(s) Oral daily  losartan 50 milliGRAM(s) Oral daily  methylPREDNISolone sodium succinate Injectable 60 milliGRAM(s) IV Push daily  metoprolol succinate ER 50 milliGRAM(s) Oral daily  pantoprazole    Tablet 40 milliGRAM(s) Oral before breakfast    MEDICATIONS  (PRN):        VITALS:   Vital Signs Last 24 Hrs  T(C): 35.6 (05 Sep 2018 04:44), Max: 36.8 (04 Sep 2018 21:26)  T(F): 96.1 (05 Sep 2018 04:44), Max: 98.3 (04 Sep 2018 21:26)  HR: 54 (05 Sep 2018 04:44) (54 - 59)  BP: 156/78 (05 Sep 2018 04:44) (122/75 - 156/78)  BP(mean): --  RR: 18 (05 Sep 2018 04:44) (18 - 18)  SpO2: --        PHYSICAL EXAM:    GENERAL: NAD, well-developed  HEAD:  Atraumatic, Normocephalic  NECK: Supple, No JVD  CHEST/LUNG: Clear to auscultation bilaterally; No wheeze  HEART: Regular rate and rhythm; No murmurs, rubs, or gallops  ABDOMEN: Soft, Nontender, Nondistended; Bowel sounds present  EXTREMITIES:  Good peripheral Pulses, No clubbing, cyanosis, or edema      LABS:                        12.9   9.89  )-----------( 249      ( 05 Sep 2018 09:16 )             40.0     09-05    141  |  98  |  18  ----------------------------<  201<H>  4.2   |  24  |  0.8    Ca    9.8      05 Sep 2018 09:16  Phos  2.5     09-05  Mg     2.2     09-05    TPro  6.2  /  Alb  4.2  /  TBili  0.4  /  DBili  x   /  AST  16  /  ALT  28  /  AlkPhos  46  09-05    LIVER FUNCTIONS - ( 05 Sep 2018 09:16 )  Alb: 4.2 g/dL / Pro: 6.2 g/dL / ALK PHOS: 46 U/L / ALT: 28 U/L / AST: 16 U/L / GGT: x                         DIAGNOSTIC STUDIES:    < from: CT Chest No Cont (03.17.18 @ 09:46) >  IMPRESSION:    Scattered subcentimeter calcified granulomas are stable. No new pulmonary   nodules.    Scattered areas of mosaic attenuation throughout both lungs likely   reflect air trapping from small airways disease.    Fatty infiltration of the liver.    < end of copied text >

## 2018-09-07 DIAGNOSIS — R06.02 SHORTNESS OF BREATH: ICD-10-CM

## 2018-09-07 DIAGNOSIS — K64.9 UNSPECIFIED HEMORRHOIDS: ICD-10-CM

## 2018-09-07 DIAGNOSIS — J44.1 CHRONIC OBSTRUCTIVE PULMONARY DISEASE WITH (ACUTE) EXACERBATION: ICD-10-CM

## 2018-09-07 DIAGNOSIS — R13.10 DYSPHAGIA, UNSPECIFIED: ICD-10-CM

## 2018-09-07 DIAGNOSIS — M54.9 DORSALGIA, UNSPECIFIED: ICD-10-CM

## 2018-09-07 DIAGNOSIS — I25.10 ATHEROSCLEROTIC HEART DISEASE OF NATIVE CORONARY ARTERY WITHOUT ANGINA PECTORIS: ICD-10-CM

## 2018-09-07 DIAGNOSIS — K21.9 GASTRO-ESOPHAGEAL REFLUX DISEASE WITHOUT ESOPHAGITIS: ICD-10-CM

## 2018-09-07 DIAGNOSIS — Z90.710 ACQUIRED ABSENCE OF BOTH CERVIX AND UTERUS: ICD-10-CM

## 2018-09-07 DIAGNOSIS — R10.13 EPIGASTRIC PAIN: ICD-10-CM

## 2018-09-07 DIAGNOSIS — I10 ESSENTIAL (PRIMARY) HYPERTENSION: ICD-10-CM

## 2018-09-07 DIAGNOSIS — M19.90 UNSPECIFIED OSTEOARTHRITIS, UNSPECIFIED SITE: ICD-10-CM

## 2018-09-07 DIAGNOSIS — Z87.891 PERSONAL HISTORY OF NICOTINE DEPENDENCE: ICD-10-CM

## 2018-09-07 DIAGNOSIS — Z90.49 ACQUIRED ABSENCE OF OTHER SPECIFIED PARTS OF DIGESTIVE TRACT: ICD-10-CM

## 2018-09-07 DIAGNOSIS — Z79.82 LONG TERM (CURRENT) USE OF ASPIRIN: ICD-10-CM

## 2018-09-07 DIAGNOSIS — E78.5 HYPERLIPIDEMIA, UNSPECIFIED: ICD-10-CM

## 2019-05-26 ENCOUNTER — EMERGENCY (EMERGENCY)
Facility: HOSPITAL | Age: 59
LOS: 0 days | Discharge: HOME | End: 2019-05-26
Attending: EMERGENCY MEDICINE | Admitting: EMERGENCY MEDICINE
Payer: MEDICAID

## 2019-05-26 VITALS
RESPIRATION RATE: 20 BRPM | HEART RATE: 75 BPM | SYSTOLIC BLOOD PRESSURE: 144 MMHG | DIASTOLIC BLOOD PRESSURE: 82 MMHG | OXYGEN SATURATION: 98 % | TEMPERATURE: 98 F

## 2019-05-26 VITALS
SYSTOLIC BLOOD PRESSURE: 110 MMHG | TEMPERATURE: 97 F | HEART RATE: 68 BPM | DIASTOLIC BLOOD PRESSURE: 55 MMHG | RESPIRATION RATE: 18 BRPM

## 2019-05-26 DIAGNOSIS — I25.10 ATHEROSCLEROTIC HEART DISEASE OF NATIVE CORONARY ARTERY WITHOUT ANGINA PECTORIS: ICD-10-CM

## 2019-05-26 DIAGNOSIS — Z79.82 LONG TERM (CURRENT) USE OF ASPIRIN: ICD-10-CM

## 2019-05-26 DIAGNOSIS — Z98.890 OTHER SPECIFIED POSTPROCEDURAL STATES: Chronic | ICD-10-CM

## 2019-05-26 DIAGNOSIS — K21.9 GASTRO-ESOPHAGEAL REFLUX DISEASE WITHOUT ESOPHAGITIS: ICD-10-CM

## 2019-05-26 DIAGNOSIS — Z90.710 ACQUIRED ABSENCE OF BOTH CERVIX AND UTERUS: Chronic | ICD-10-CM

## 2019-05-26 DIAGNOSIS — Z79.899 OTHER LONG TERM (CURRENT) DRUG THERAPY: ICD-10-CM

## 2019-05-26 DIAGNOSIS — R06.02 SHORTNESS OF BREATH: ICD-10-CM

## 2019-05-26 DIAGNOSIS — J02.9 ACUTE PHARYNGITIS, UNSPECIFIED: ICD-10-CM

## 2019-05-26 DIAGNOSIS — Z90.49 ACQUIRED ABSENCE OF OTHER SPECIFIED PARTS OF DIGESTIVE TRACT: Chronic | ICD-10-CM

## 2019-05-26 DIAGNOSIS — R10.13 EPIGASTRIC PAIN: ICD-10-CM

## 2019-05-26 LAB
ALBUMIN SERPL ELPH-MCNC: 4.3 G/DL — SIGNIFICANT CHANGE UP (ref 3.5–5.2)
ALP SERPL-CCNC: 55 U/L — SIGNIFICANT CHANGE UP (ref 30–115)
ALT FLD-CCNC: 25 U/L — SIGNIFICANT CHANGE UP (ref 0–41)
ANION GAP SERPL CALC-SCNC: 13 MMOL/L — SIGNIFICANT CHANGE UP (ref 7–14)
APTT BLD: 33.3 SEC — SIGNIFICANT CHANGE UP (ref 27–39.2)
AST SERPL-CCNC: 24 U/L — SIGNIFICANT CHANGE UP (ref 0–41)
BASOPHILS # BLD AUTO: 0.01 K/UL — SIGNIFICANT CHANGE UP (ref 0–0.2)
BASOPHILS NFR BLD AUTO: 0.1 % — SIGNIFICANT CHANGE UP (ref 0–1)
BILIRUB SERPL-MCNC: 0.5 MG/DL — SIGNIFICANT CHANGE UP (ref 0.2–1.2)
BUN SERPL-MCNC: 11 MG/DL — SIGNIFICANT CHANGE UP (ref 10–20)
CALCIUM SERPL-MCNC: 9.3 MG/DL — SIGNIFICANT CHANGE UP (ref 8.5–10.1)
CHLORIDE SERPL-SCNC: 102 MMOL/L — SIGNIFICANT CHANGE UP (ref 98–110)
CO2 SERPL-SCNC: 25 MMOL/L — SIGNIFICANT CHANGE UP (ref 17–32)
CREAT SERPL-MCNC: 0.6 MG/DL — LOW (ref 0.7–1.5)
EOSINOPHIL # BLD AUTO: 0.08 K/UL — SIGNIFICANT CHANGE UP (ref 0–0.7)
EOSINOPHIL NFR BLD AUTO: 1.1 % — SIGNIFICANT CHANGE UP (ref 0–8)
GLUCOSE SERPL-MCNC: 121 MG/DL — HIGH (ref 70–99)
HCT VFR BLD CALC: 39.2 % — SIGNIFICANT CHANGE UP (ref 37–47)
HGB BLD-MCNC: 13.1 G/DL — SIGNIFICANT CHANGE UP (ref 12–16)
IMM GRANULOCYTES NFR BLD AUTO: 0.3 % — SIGNIFICANT CHANGE UP (ref 0.1–0.3)
INR BLD: 0.96 RATIO — SIGNIFICANT CHANGE UP (ref 0.65–1.3)
LIDOCAIN IGE QN: 29 U/L — SIGNIFICANT CHANGE UP (ref 7–60)
LYMPHOCYTES # BLD AUTO: 1.53 K/UL — SIGNIFICANT CHANGE UP (ref 1.2–3.4)
LYMPHOCYTES # BLD AUTO: 21.6 % — SIGNIFICANT CHANGE UP (ref 20.5–51.1)
MCHC RBC-ENTMCNC: 28.8 PG — SIGNIFICANT CHANGE UP (ref 27–31)
MCHC RBC-ENTMCNC: 33.4 G/DL — SIGNIFICANT CHANGE UP (ref 32–37)
MCV RBC AUTO: 86.2 FL — SIGNIFICANT CHANGE UP (ref 81–99)
MONOCYTES # BLD AUTO: 0.37 K/UL — SIGNIFICANT CHANGE UP (ref 0.1–0.6)
MONOCYTES NFR BLD AUTO: 5.2 % — SIGNIFICANT CHANGE UP (ref 1.7–9.3)
NEUTROPHILS # BLD AUTO: 5.08 K/UL — SIGNIFICANT CHANGE UP (ref 1.4–6.5)
NEUTROPHILS NFR BLD AUTO: 71.7 % — SIGNIFICANT CHANGE UP (ref 42.2–75.2)
NRBC # BLD: 0 /100 WBCS — SIGNIFICANT CHANGE UP (ref 0–0)
NT-PROBNP SERPL-SCNC: 30 PG/ML — SIGNIFICANT CHANGE UP (ref 0–300)
PLATELET # BLD AUTO: 237 K/UL — SIGNIFICANT CHANGE UP (ref 130–400)
POTASSIUM SERPL-MCNC: 4 MMOL/L — SIGNIFICANT CHANGE UP (ref 3.5–5)
POTASSIUM SERPL-SCNC: 4 MMOL/L — SIGNIFICANT CHANGE UP (ref 3.5–5)
PROT SERPL-MCNC: 6.4 G/DL — SIGNIFICANT CHANGE UP (ref 6–8)
PROTHROM AB SERPL-ACNC: 11.1 SEC — SIGNIFICANT CHANGE UP (ref 9.95–12.87)
RBC # BLD: 4.55 M/UL — SIGNIFICANT CHANGE UP (ref 4.2–5.4)
RBC # FLD: 13.1 % — SIGNIFICANT CHANGE UP (ref 11.5–14.5)
SODIUM SERPL-SCNC: 140 MMOL/L — SIGNIFICANT CHANGE UP (ref 135–146)
TROPONIN T SERPL-MCNC: <0.01 NG/ML — SIGNIFICANT CHANGE UP
WBC # BLD: 7.09 K/UL — SIGNIFICANT CHANGE UP (ref 4.8–10.8)
WBC # FLD AUTO: 7.09 K/UL — SIGNIFICANT CHANGE UP (ref 4.8–10.8)

## 2019-05-26 PROCEDURE — 93010 ELECTROCARDIOGRAM REPORT: CPT

## 2019-05-26 PROCEDURE — 71045 X-RAY EXAM CHEST 1 VIEW: CPT | Mod: 26

## 2019-05-26 PROCEDURE — 99285 EMERGENCY DEPT VISIT HI MDM: CPT

## 2019-05-26 RX ORDER — FAMOTIDINE 10 MG/ML
20 INJECTION INTRAVENOUS ONCE
Refills: 0 | Status: COMPLETED | OUTPATIENT
Start: 2019-05-26 | End: 2019-05-26

## 2019-05-26 RX ORDER — DIPHENHYDRAMINE HYDROCHLORIDE AND LIDOCAINE HYDROCHLORIDE AND ALUMINUM HYDROXIDE AND MAGNESIUM HYDRO
30 KIT ONCE
Refills: 0 | Status: COMPLETED | OUTPATIENT
Start: 2019-05-26 | End: 2019-05-26

## 2019-05-26 RX ADMIN — DIPHENHYDRAMINE HYDROCHLORIDE AND LIDOCAINE HYDROCHLORIDE AND ALUMINUM HYDROXIDE AND MAGNESIUM HYDRO 30 MILLILITER(S): KIT at 13:31

## 2019-05-26 RX ADMIN — FAMOTIDINE 20 MILLIGRAM(S): 10 INJECTION INTRAVENOUS at 12:41

## 2019-05-26 NOTE — ED PROVIDER NOTE - NS ED ROS FT
Review of Systems:  •	CONSTITUTIONAL - No fever, No diaphoresis, No weight change  •	SKIN - No rash  •	HEMATOLOGIC - No abnormal bleeding or bruising  •	EYES - No eye pain, No blurred vision  •	ENT - No change in hearing, +sore throat, No neck pain, No rhinorrhea, No ear pain  •	RESPIRATORY - +shortness of breath, No cough  •	CARDIAC -No chest pain, No palpitations  •	GI - +abdominal pain, No nausea, No vomiting, No diarrhea, No constipation, No bright red blood per rectum or melena. No flank pain  •                 - No dysuria, frequency, hematuria.   •	ENDO - No polydypsia, No polyuria, No heat/cold intolerance  •	MUSCULOSKELETAL - No joint paint, No swelling, No back pain  •	NEUROLOGIC - No numbness, No focal weakness, No headache, No dizziness  All other systems negative, unless specified in HPI

## 2019-05-26 NOTE — ED PROVIDER NOTE - PLAN OF CARE
a/p: chronic throat "choking" sensation assoc w epigastric burning, SOB, really needs GI Eval as outpt for EGD/esophagram, will do labs, ekg/trop, CXR r/o atypical acs, r/o ptx, r/o pna, normal speech, no resp distress, tolerating po, symptoms x 5 yrs, will give ivf, pepcid, hurricaine and re-eval. pt taking PPI as outpt.

## 2019-05-26 NOTE — ED PROVIDER NOTE - CARE PROVIDER_API CALL
Francie Smith)  Internal Medicine  [address] [place] [address]  Phone: [phone]  Fax: [phone]  Follow Up Time:

## 2019-05-26 NOTE — ED PROVIDER NOTE - PHYSICAL EXAMINATION
VITAL SIGNS: AFebrile, vital signs stable  CONSTITUTIONAL: Well-developed; well-nourished; in no acute distress.  SKIN: Skin exam is warm and dry, no acute rash.  HEAD: Normocephalic; atraumatic.  EYES: Pupils equal round reactive to light, Extraocular movements intact; conjunctiva and sclera clear.  ENT: No nasal discharge; airway clear. Moist mucus membranes. tolerating secretions. no trismus. no op erythema/edema/exudates.  NECK: Supple; non tender. No rigidity  CARD: Regular rate and rhythm. Normal S1, S2; no murmurs, gallops, or rubs.  RESP: Lungs clear to auscultation bilaterally. No wheezes, rales or rhonchi.  ABD: Abdomen soft; non-tender; non-distended;  no hepatosplenomegaly. No costovertebral angle tenderness.   EXT: Normal ROM. No clubbing, cyanosis or edema. No calf tenderness to palpation.  NEURO: Alert and oriented x 3. No focal deficits.  PSYCH: Cooperative, appropriate.

## 2019-05-26 NOTE — ED PROVIDER NOTE - OBJECTIVE STATEMENT
58F PMH copd, htn, gerd, nissen fundoplication, s/p cholecystectomy, p/w chronic complaint of intermittent burning in throat, which causes sensation of choking/sob. feels a pressure in epigastrium that radiates to throat. was admitted in 9/2018 w similar complaints, seen by gi but refused esophagram/EDG in hospital and never followed up as outpt to obtain these studies because she is frustrated after seeing several doctors she has not obtain definitive diagnosis. states all these symptoms started after nissen fundoplication < 5 yrs ago. no cp. no fever, cough. no trauma. no nvdc. no dysuria, freq, hematuria. no tearing bp. no le edema, le pain, immobilization, hormones, hemoptysis. does not have a GI at this time. no brbpr or melena. today symptoms not relieved w omeprazole so came to ED.

## 2019-05-26 NOTE — ED PROVIDER NOTE - CARE PLAN
Principal Discharge DX:	Shortness of breath  Secondary Diagnosis:	Epigastric pain Principal Discharge DX:	Shortness of breath  Assessment and plan of treatment:	a/p: chronic throat "choking" sensation assoc w epigastric burning, SOB, really needs GI Eval as outpt for EGD/esophagram, will do labs, ekg/trop, CXR r/o atypical acs, r/o ptx, r/o pna, normal speech, no resp distress, tolerating po, symptoms x 5 yrs, will give ivf, pepcid, hurricaine and re-eval. pt taking PPI as outpt.  Secondary Diagnosis:	Epigastric pain

## 2019-05-26 NOTE — ED ADULT TRIAGE NOTE - CHIEF COMPLAINT QUOTE
Pt c/o weakness, nausea, and throat burning/pain. Pt has hx of acid reflux, states this has happened to her in the past, takes Omeprazole, but today it became worse.

## 2019-05-26 NOTE — ED PROVIDER NOTE - CLINICAL SUMMARY MEDICAL DECISION MAKING FREE TEXT BOX
pt states she feels much better, tolerating po, abd soft ntnd, lctab, will dc home w GI f/u 1-2 weeks, strict return precautions provided.

## 2019-05-31 ENCOUNTER — EMERGENCY (EMERGENCY)
Facility: HOSPITAL | Age: 59
LOS: 1 days | End: 2019-05-31
Attending: EMERGENCY MEDICINE | Admitting: INTERNAL MEDICINE
Payer: MEDICAID

## 2019-05-31 VITALS
SYSTOLIC BLOOD PRESSURE: 114 MMHG | DIASTOLIC BLOOD PRESSURE: 68 MMHG | TEMPERATURE: 98 F | OXYGEN SATURATION: 95 % | HEART RATE: 75 BPM | RESPIRATION RATE: 18 BRPM

## 2019-05-31 VITALS
RESPIRATION RATE: 18 BRPM | SYSTOLIC BLOOD PRESSURE: 137 MMHG | DIASTOLIC BLOOD PRESSURE: 80 MMHG | OXYGEN SATURATION: 97 % | TEMPERATURE: 98 F | HEART RATE: 63 BPM

## 2019-05-31 DIAGNOSIS — Z90.49 ACQUIRED ABSENCE OF OTHER SPECIFIED PARTS OF DIGESTIVE TRACT: Chronic | ICD-10-CM

## 2019-05-31 DIAGNOSIS — R10.13 EPIGASTRIC PAIN: ICD-10-CM

## 2019-05-31 DIAGNOSIS — I10 ESSENTIAL (PRIMARY) HYPERTENSION: ICD-10-CM

## 2019-05-31 DIAGNOSIS — Z98.890 OTHER SPECIFIED POSTPROCEDURAL STATES: Chronic | ICD-10-CM

## 2019-05-31 DIAGNOSIS — Z79.899 OTHER LONG TERM (CURRENT) DRUG THERAPY: ICD-10-CM

## 2019-05-31 DIAGNOSIS — Z79.82 LONG TERM (CURRENT) USE OF ASPIRIN: ICD-10-CM

## 2019-05-31 DIAGNOSIS — Z90.710 ACQUIRED ABSENCE OF BOTH CERVIX AND UTERUS: Chronic | ICD-10-CM

## 2019-05-31 DIAGNOSIS — R25.3 FASCICULATION: ICD-10-CM

## 2019-05-31 LAB
ALBUMIN SERPL ELPH-MCNC: 4.5 G/DL — SIGNIFICANT CHANGE UP (ref 3.5–5.2)
ALP SERPL-CCNC: 57 U/L — SIGNIFICANT CHANGE UP (ref 30–115)
ALT FLD-CCNC: 33 U/L — SIGNIFICANT CHANGE UP (ref 0–41)
ANION GAP SERPL CALC-SCNC: 14 MMOL/L — SIGNIFICANT CHANGE UP (ref 7–14)
APPEARANCE UR: CLEAR — SIGNIFICANT CHANGE UP
AST SERPL-CCNC: 30 U/L — SIGNIFICANT CHANGE UP (ref 0–41)
BASE EXCESS BLDV CALC-SCNC: 5.4 MMOL/L — HIGH (ref -2–2)
BASOPHILS # BLD AUTO: 0.02 K/UL — SIGNIFICANT CHANGE UP (ref 0–0.2)
BASOPHILS NFR BLD AUTO: 0.3 % — SIGNIFICANT CHANGE UP (ref 0–1)
BILIRUB SERPL-MCNC: 0.7 MG/DL — SIGNIFICANT CHANGE UP (ref 0.2–1.2)
BILIRUB UR-MCNC: NEGATIVE — SIGNIFICANT CHANGE UP
BUN SERPL-MCNC: 10 MG/DL — SIGNIFICANT CHANGE UP (ref 10–20)
CA-I SERPL-SCNC: 1.23 MMOL/L — SIGNIFICANT CHANGE UP (ref 1.12–1.3)
CALCIUM SERPL-MCNC: 9.9 MG/DL — SIGNIFICANT CHANGE UP (ref 8.5–10.1)
CHLORIDE SERPL-SCNC: 98 MMOL/L — SIGNIFICANT CHANGE UP (ref 98–110)
CO2 SERPL-SCNC: 26 MMOL/L — SIGNIFICANT CHANGE UP (ref 17–32)
COLOR SPEC: YELLOW — SIGNIFICANT CHANGE UP
CREAT SERPL-MCNC: 0.7 MG/DL — SIGNIFICANT CHANGE UP (ref 0.7–1.5)
DIFF PNL FLD: NEGATIVE — SIGNIFICANT CHANGE UP
EOSINOPHIL # BLD AUTO: 0.1 K/UL — SIGNIFICANT CHANGE UP (ref 0–0.7)
EOSINOPHIL NFR BLD AUTO: 1.5 % — SIGNIFICANT CHANGE UP (ref 0–8)
EPI CELLS # UR: ABNORMAL /HPF
GAS PNL BLDV: 142 MMOL/L — SIGNIFICANT CHANGE UP (ref 136–145)
GAS PNL BLDV: SIGNIFICANT CHANGE UP
GLUCOSE SERPL-MCNC: 117 MG/DL — HIGH (ref 70–99)
GLUCOSE UR QL: NEGATIVE — SIGNIFICANT CHANGE UP
HCO3 BLDV-SCNC: 30 MMOL/L — HIGH (ref 22–29)
HCT VFR BLD CALC: 41.7 % — SIGNIFICANT CHANGE UP (ref 37–47)
HCT VFR BLDA CALC: 45.2 % — HIGH (ref 34–44)
HGB BLD CALC-MCNC: 14.7 G/DL — SIGNIFICANT CHANGE UP (ref 14–18)
HGB BLD-MCNC: 13.9 G/DL — SIGNIFICANT CHANGE UP (ref 12–16)
IMM GRANULOCYTES NFR BLD AUTO: 0.3 % — SIGNIFICANT CHANGE UP (ref 0.1–0.3)
KETONES UR-MCNC: NEGATIVE — SIGNIFICANT CHANGE UP
LACTATE BLDV-MCNC: 1.7 MMOL/L — HIGH (ref 0.5–1.6)
LACTATE SERPL-SCNC: 1.9 MMOL/L — SIGNIFICANT CHANGE UP (ref 0.5–2.2)
LEUKOCYTE ESTERASE UR-ACNC: ABNORMAL
LIDOCAIN IGE QN: 33 U/L — SIGNIFICANT CHANGE UP (ref 7–60)
LYMPHOCYTES # BLD AUTO: 2.35 K/UL — SIGNIFICANT CHANGE UP (ref 1.2–3.4)
LYMPHOCYTES # BLD AUTO: 35 % — SIGNIFICANT CHANGE UP (ref 20.5–51.1)
MAGNESIUM SERPL-MCNC: 2.4 MG/DL — SIGNIFICANT CHANGE UP (ref 1.8–2.4)
MCHC RBC-ENTMCNC: 28.8 PG — SIGNIFICANT CHANGE UP (ref 27–31)
MCHC RBC-ENTMCNC: 33.3 G/DL — SIGNIFICANT CHANGE UP (ref 32–37)
MCV RBC AUTO: 86.3 FL — SIGNIFICANT CHANGE UP (ref 81–99)
MONOCYTES # BLD AUTO: 0.43 K/UL — SIGNIFICANT CHANGE UP (ref 0.1–0.6)
MONOCYTES NFR BLD AUTO: 6.4 % — SIGNIFICANT CHANGE UP (ref 1.7–9.3)
NEUTROPHILS # BLD AUTO: 3.79 K/UL — SIGNIFICANT CHANGE UP (ref 1.4–6.5)
NEUTROPHILS NFR BLD AUTO: 56.5 % — SIGNIFICANT CHANGE UP (ref 42.2–75.2)
NITRITE UR-MCNC: NEGATIVE — SIGNIFICANT CHANGE UP
NRBC # BLD: 0 /100 WBCS — SIGNIFICANT CHANGE UP (ref 0–0)
PCO2 BLDV: 45 MMHG — SIGNIFICANT CHANGE UP (ref 41–51)
PH BLDV: 7.44 — HIGH (ref 7.26–7.43)
PH UR: 6.5 — SIGNIFICANT CHANGE UP (ref 5–8)
PLATELET # BLD AUTO: 276 K/UL — SIGNIFICANT CHANGE UP (ref 130–400)
PO2 BLDV: 25 MMHG — SIGNIFICANT CHANGE UP (ref 20–40)
POTASSIUM BLDV-SCNC: 3.5 MMOL/L — SIGNIFICANT CHANGE UP (ref 3.3–5.6)
POTASSIUM SERPL-MCNC: 3.9 MMOL/L — SIGNIFICANT CHANGE UP (ref 3.5–5)
POTASSIUM SERPL-SCNC: 3.9 MMOL/L — SIGNIFICANT CHANGE UP (ref 3.5–5)
PROT SERPL-MCNC: 6.9 G/DL — SIGNIFICANT CHANGE UP (ref 6–8)
PROT UR-MCNC: NEGATIVE — SIGNIFICANT CHANGE UP
RBC # BLD: 4.83 M/UL — SIGNIFICANT CHANGE UP (ref 4.2–5.4)
RBC # FLD: 13.1 % — SIGNIFICANT CHANGE UP (ref 11.5–14.5)
SAO2 % BLDV: 45 % — SIGNIFICANT CHANGE UP
SODIUM SERPL-SCNC: 138 MMOL/L — SIGNIFICANT CHANGE UP (ref 135–146)
SP GR SPEC: >=1.03 — SIGNIFICANT CHANGE UP (ref 1.01–1.03)
TROPONIN T SERPL-MCNC: <0.01 NG/ML — SIGNIFICANT CHANGE UP
UROBILINOGEN FLD QL: 0.2 — SIGNIFICANT CHANGE UP (ref 0.2–0.2)
WBC # BLD: 6.71 K/UL — SIGNIFICANT CHANGE UP (ref 4.8–10.8)
WBC # FLD AUTO: 6.71 K/UL — SIGNIFICANT CHANGE UP (ref 4.8–10.8)
WBC UR QL: ABNORMAL /HPF

## 2019-05-31 PROCEDURE — 71275 CT ANGIOGRAPHY CHEST: CPT | Mod: 26

## 2019-05-31 PROCEDURE — 93010 ELECTROCARDIOGRAM REPORT: CPT

## 2019-05-31 PROCEDURE — 71045 X-RAY EXAM CHEST 1 VIEW: CPT | Mod: 26

## 2019-05-31 PROCEDURE — 99284 EMERGENCY DEPT VISIT MOD MDM: CPT

## 2019-05-31 PROCEDURE — 74177 CT ABD & PELVIS W/CONTRAST: CPT | Mod: 26

## 2019-05-31 PROCEDURE — 71270 CT THORAX DX C-/C+: CPT | Mod: 26,59

## 2019-05-31 RX ORDER — DIPHENHYDRAMINE HYDROCHLORIDE AND LIDOCAINE HYDROCHLORIDE AND ALUMINUM HYDROXIDE AND MAGNESIUM HYDRO
30 KIT ONCE
Refills: 0 | Status: COMPLETED | OUTPATIENT
Start: 2019-05-31 | End: 2019-05-31

## 2019-05-31 RX ORDER — SODIUM CHLORIDE 9 MG/ML
1000 INJECTION, SOLUTION INTRAVENOUS ONCE
Refills: 0 | Status: COMPLETED | OUTPATIENT
Start: 2019-05-31 | End: 2019-05-31

## 2019-05-31 RX ORDER — FAMOTIDINE 10 MG/ML
20 INJECTION INTRAVENOUS ONCE
Refills: 0 | Status: COMPLETED | OUTPATIENT
Start: 2019-05-31 | End: 2019-05-31

## 2019-05-31 RX ADMIN — FAMOTIDINE 20 MILLIGRAM(S): 10 INJECTION INTRAVENOUS at 17:25

## 2019-05-31 RX ADMIN — DIPHENHYDRAMINE HYDROCHLORIDE AND LIDOCAINE HYDROCHLORIDE AND ALUMINUM HYDROXIDE AND MAGNESIUM HYDRO 30 MILLILITER(S): KIT at 19:08

## 2019-05-31 RX ADMIN — SODIUM CHLORIDE 2000 MILLILITER(S): 9 INJECTION, SOLUTION INTRAVENOUS at 18:31

## 2019-05-31 NOTE — ED PROVIDER NOTE - CLINICAL SUMMARY MEDICAL DECISION MAKING FREE TEXT BOX
59 yo Khmer speaking woman p/w twitching, cp, abd pain and belching.  labs and imaging negative. Pt being evaluated by multiple doctors as outpt including cardiology and ent with negative workups. Pt to follow up with pmd, possilble anxiety induced as well outpt mental health follow up given, return precautions discussed.

## 2019-05-31 NOTE — ED PROVIDER NOTE - CARE PROVIDERS DIRECT ADDRESSES
[email or web address]

## 2019-05-31 NOTE — ED PROVIDER NOTE - PHYSICAL EXAMINATION
CONSTITUTIONAL: Well-developed; well-nourished; in no acute distress.   SKIN: warm, dry  HEAD: Normocephalic; atraumatic.  EYES: normal sclera and conjunctiva   ENT: No nasal discharge; airway clear. No tonsillar, uvular swelling.   NECK: Supple; non tender.  CARD: S1, S2 normal; no murmurs, gallops, or rubs. Regular rate and rhythm.   RESP: No wheezes, rales or rhonchi.  ABD: soft, nondistended. Mild ttp over epigastrium.   EXT: Normal ROM.  No clubbing, cyanosis or edema.   LYMPH: No acute cervical adenopathy.  NEURO: Alert, oriented, grossly unremarkable. No cranial nerve deficits. Moving all extremities well.   PSYCH: Cooperative, appropriate.

## 2019-05-31 NOTE — ED PROVIDER NOTE - PROGRESS NOTE DETAILS
ATTENDING NOTE:   I personally evaluated the patient. I reviewed the Resident’s note (as assigned above), and agree with the findings and plan except as documented in my note.   59 y/o F Kazakh speaking HX provider per daughter at bedside, presents with multiple complaints. Pt notes twitching face for about 1 week also notes twitching to shoulders, gastritis with belching ongoing for years, choking sensation to throat as well as CP and abdominal pain. Has been evaluated for multiple complaints in the past with multiple negative work ups. Saw ENT last week with negative scope. Had negative nuclear stress test and angio done by Dr. Krishna. Pt is nervous as per daughter but does not think she is. Is taking her medication from [place] possibly Xanax that a friend gave her with sx not improving. Exam as noted above. A/P: Will check labs, CT and reassess. Will discuss with PMD regarding worsening anxiety as a possible cause of pt’s many SX with multiple negative work ups. Pt feels improved, states that twitching has improved and abd pain has improved. Discussed return precautions. Discussed f/u with Dr. Perez this week and neurology appt.

## 2019-05-31 NOTE — ED PROVIDER NOTE - CARE PROVIDER_API CALL
Romel Garrett)  EEGEpilepsy; Neurology  [address] [facility], [address]  [place] [address]  Phone: [phone]  Fax: [phone]  Follow Up Time:     Eddie Perez)  Internal Medicine  [address] [place] [address]  Phone: [phone]  Fax: [phone]  Follow Up Time:

## 2019-05-31 NOTE — ED PROVIDER NOTE - NS ED ROS FT
Eyes:  No visual changes, eye pain or discharge.  ENMT:  No hearing changes, pain, discharge or infections. No neck pain or stiffness.  Cardiac: CP. No SOB or edema.   Respiratory:  No cough or respiratory distress. No hemoptysis. No history of asthma or RAD.  GI:  Abd pain. No nausea, vomiting, diarrhea   :  No dysuria, frequency or burning.  MS:  No myalgia, muscle weakness, joint pain or back pain.  Neuro:  No headache or weakness.  No LOC. Twitching of face and arms.   Skin:  No skin rash.   Endocrine: No history of thyroid disease or diabetes.

## 2019-05-31 NOTE — ED PROVIDER NOTE - NSFOLLOWUPCLINICS_GEN_ALL_ED_FT
[facility]  [facility]  [address] [place] [id number]  Phone: [phone]  Fax:   Follow Up Time:

## 2019-05-31 NOTE — ED PROVIDER NOTE - OBJECTIVE STATEMENT
58 y f pmh gastritis, htn pw twitching. Twitching to the face and arms for the past week. No exacerbating or alleviating factors. Also c/o epigastric abdominal pain and choking sensation to throat. Has had negative egd, stress test and angio within the past few years. Denies n/v, fever, chills, urinary sx, back pain, palpitations, sob.

## 2019-05-31 NOTE — ED ADULT NURSE NOTE - OBJECTIVE STATEMENT
Pt daughter stated they were here on Sunday because pt has being getting upper body shaking and twitching on her mouth. Denies pain, pt ff-up with ENT specialist, but daughter said pt condition is getting worst since they were last seen heres in ED. No dizziness, is able to attend to her ADLs.

## 2019-06-15 ENCOUNTER — OUTPATIENT (OUTPATIENT)
Dept: OUTPATIENT SERVICES | Facility: HOSPITAL | Age: 59
LOS: 1 days | Discharge: HOME | End: 2019-06-15
Payer: MEDICAID

## 2019-06-15 DIAGNOSIS — R49.0 DYSPHONIA: ICD-10-CM

## 2019-06-15 DIAGNOSIS — Z90.49 ACQUIRED ABSENCE OF OTHER SPECIFIED PARTS OF DIGESTIVE TRACT: Chronic | ICD-10-CM

## 2019-06-15 DIAGNOSIS — Z90.710 ACQUIRED ABSENCE OF BOTH CERVIX AND UTERUS: Chronic | ICD-10-CM

## 2019-06-15 DIAGNOSIS — Z98.890 OTHER SPECIFIED POSTPROCEDURAL STATES: Chronic | ICD-10-CM

## 2019-06-15 PROCEDURE — 70491 CT SOFT TISSUE NECK W/DYE: CPT | Mod: 26

## 2019-09-20 ENCOUNTER — OUTPATIENT (OUTPATIENT)
Dept: OUTPATIENT SERVICES | Facility: HOSPITAL | Age: 59
LOS: 1 days | Discharge: HOME | End: 2019-09-20
Payer: MEDICAID

## 2019-09-20 DIAGNOSIS — R10.9 UNSPECIFIED ABDOMINAL PAIN: ICD-10-CM

## 2019-09-20 DIAGNOSIS — Z90.49 ACQUIRED ABSENCE OF OTHER SPECIFIED PARTS OF DIGESTIVE TRACT: Chronic | ICD-10-CM

## 2019-09-20 DIAGNOSIS — Z90.710 ACQUIRED ABSENCE OF BOTH CERVIX AND UTERUS: Chronic | ICD-10-CM

## 2019-09-20 DIAGNOSIS — K21.9 GASTRO-ESOPHAGEAL REFLUX DISEASE WITHOUT ESOPHAGITIS: ICD-10-CM

## 2019-09-20 DIAGNOSIS — Z98.890 OTHER SPECIFIED POSTPROCEDURAL STATES: Chronic | ICD-10-CM

## 2019-09-20 PROCEDURE — 74220 X-RAY XM ESOPHAGUS 1CNTRST: CPT | Mod: 26

## 2024-02-09 NOTE — H&P ADULT - REASON FOR ADMISSION
Cough, SOB, Intermittent chest pain Is Thalidomide Contraindicated?: No Dupixent Dosing: 600 mg SC day 0 then 300 mg SC every other week Cyclosporine Specific Contraindications (Override- The Patient.....): is of childbearing potential Is Cyclosporine Contraindicated?: Yes Diagnosis (Required): Atopic Dermatitis/Eczematous Dermatitis Detail Level: Zone Pregnancy And Lactation Warning Text: There have not been adverse fetal risks in women taking Dupixent while pregnant. It is unknown if this medication is excreted in breast milk. Dupixent Monitoring Guidelines: There is no laboratory monitoring requirement with Dupixent.

## 2024-03-19 NOTE — PATIENT PROFILE ADULT. - HARM RISK FACTORS
From: Krystle Kemp  To: Boris Armas  Sent: 3/19/2024 4:14 PM CDT  Subject: Trulicity    Hi. I know you refilled the prescription but it’s saying it needs prior approval. I have been without close to a month now.   no

## 2024-11-05 ENCOUNTER — APPOINTMENT (OUTPATIENT)
Dept: PULMONOLOGY | Facility: CLINIC | Age: 64
End: 2024-11-05
Payer: MEDICAID

## 2024-11-05 VITALS
SYSTOLIC BLOOD PRESSURE: 118 MMHG | DIASTOLIC BLOOD PRESSURE: 72 MMHG | WEIGHT: 199 LBS | HEIGHT: 64 IN | OXYGEN SATURATION: 98 % | HEART RATE: 91 BPM | BODY MASS INDEX: 33.97 KG/M2

## 2024-11-05 DIAGNOSIS — J45.909 UNSPECIFIED ASTHMA, UNCOMPLICATED: ICD-10-CM

## 2024-11-05 DIAGNOSIS — Z87.19 PERSONAL HISTORY OF OTHER DISEASES OF THE DIGESTIVE SYSTEM: ICD-10-CM

## 2024-11-05 DIAGNOSIS — E66.01 MORBID (SEVERE) OBESITY DUE TO EXCESS CALORIES: ICD-10-CM

## 2024-11-05 DIAGNOSIS — Z86.39 PERSONAL HISTORY OF OTHER ENDOCRINE, NUTRITIONAL AND METABOLIC DISEASE: ICD-10-CM

## 2024-11-05 DIAGNOSIS — Z86.79 PERSONAL HISTORY OF OTHER DISEASES OF THE CIRCULATORY SYSTEM: ICD-10-CM

## 2024-11-05 DIAGNOSIS — R09.82 POSTNASAL DRIP: ICD-10-CM

## 2024-11-05 DIAGNOSIS — Z87.891 PERSONAL HISTORY OF NICOTINE DEPENDENCE: ICD-10-CM

## 2024-11-05 DIAGNOSIS — R05.3 CHRONIC COUGH: ICD-10-CM

## 2024-11-05 PROCEDURE — 99204 OFFICE O/P NEW MOD 45 MIN: CPT

## 2024-11-05 PROCEDURE — G2211 COMPLEX E/M VISIT ADD ON: CPT | Mod: NC

## 2024-11-05 RX ORDER — ATORVASTATIN CALCIUM 80 MG/1
TABLET, FILM COATED ORAL
Refills: 0 | Status: ACTIVE | COMMUNITY

## 2024-11-05 RX ORDER — OMEPRAZOLE 40 MG/1
40 CAPSULE, DELAYED RELEASE ORAL
Refills: 0 | Status: ACTIVE | COMMUNITY

## 2024-11-05 RX ORDER — EMPAGLIFLOZIN 25 MG/1
TABLET, FILM COATED ORAL
Refills: 0 | Status: ACTIVE | COMMUNITY

## 2024-11-05 RX ORDER — FAMOTIDINE 40 MG/1
40 TABLET, FILM COATED ORAL
Refills: 0 | Status: ACTIVE | COMMUNITY

## 2024-11-05 RX ORDER — SUCRALFATE 1 G/1
TABLET ORAL
Refills: 0 | Status: ACTIVE | COMMUNITY

## 2024-11-05 RX ORDER — AMLODIPINE BESYLATE 5 MG/1
TABLET ORAL
Refills: 0 | Status: ACTIVE | COMMUNITY

## 2024-11-05 RX ORDER — SEMAGLUTIDE 1.34 MG/ML
4 INJECTION, SOLUTION SUBCUTANEOUS
Refills: 0 | Status: ACTIVE | COMMUNITY

## 2024-11-05 RX ORDER — AZELASTINE HYDROCHLORIDE 137 UG/1
0.1 SPRAY, METERED NASAL
Qty: 1 | Refills: 3 | Status: ACTIVE | COMMUNITY
Start: 2024-11-05 | End: 1900-01-01

## 2024-11-05 RX ORDER — AMITRIPTYLINE HYDROCHLORIDE 75 MG/1
TABLET, FILM COATED ORAL
Refills: 0 | Status: ACTIVE | COMMUNITY

## 2024-11-05 RX ORDER — TRAZODONE HYDROCHLORIDE 300 MG/1
TABLET ORAL
Refills: 0 | Status: ACTIVE | COMMUNITY

## 2024-11-05 RX ORDER — LOSARTAN POTASSIUM 50 MG/1
50 TABLET, FILM COATED ORAL
Refills: 0 | Status: ACTIVE | COMMUNITY

## 2024-11-05 RX ORDER — BUDESONIDE AND FORMOTEROL FUMARATE DIHYDRATE 160; 4.5 UG/1; UG/1
160-4.5 AEROSOL RESPIRATORY (INHALATION) TWICE DAILY
Qty: 1 | Refills: 2 | Status: ACTIVE | COMMUNITY
Start: 2024-11-05 | End: 1900-01-01

## 2024-11-05 RX ORDER — METOPROLOL SUCCINATE 50 MG/1
50 TABLET, EXTENDED RELEASE ORAL
Refills: 0 | Status: ACTIVE | COMMUNITY

## 2024-12-09 ENCOUNTER — RESULT REVIEW (OUTPATIENT)
Age: 64
End: 2024-12-09

## 2024-12-09 ENCOUNTER — OUTPATIENT (OUTPATIENT)
Dept: OUTPATIENT SERVICES | Facility: HOSPITAL | Age: 64
LOS: 1 days | End: 2024-12-09
Payer: MEDICAID

## 2024-12-09 DIAGNOSIS — Z00.8 ENCOUNTER FOR OTHER GENERAL EXAMINATION: ICD-10-CM

## 2024-12-09 DIAGNOSIS — J45.909 UNSPECIFIED ASTHMA, UNCOMPLICATED: ICD-10-CM

## 2024-12-09 DIAGNOSIS — Z98.890 OTHER SPECIFIED POSTPROCEDURAL STATES: Chronic | ICD-10-CM

## 2024-12-09 DIAGNOSIS — Z90.710 ACQUIRED ABSENCE OF BOTH CERVIX AND UTERUS: Chronic | ICD-10-CM

## 2024-12-09 DIAGNOSIS — Z90.49 ACQUIRED ABSENCE OF OTHER SPECIFIED PARTS OF DIGESTIVE TRACT: Chronic | ICD-10-CM

## 2024-12-09 PROCEDURE — 71250 CT THORAX DX C-: CPT | Mod: 26

## 2024-12-09 PROCEDURE — 71250 CT THORAX DX C-: CPT

## 2024-12-10 DIAGNOSIS — J45.909 UNSPECIFIED ASTHMA, UNCOMPLICATED: ICD-10-CM

## 2025-02-14 ENCOUNTER — APPOINTMENT (OUTPATIENT)
Dept: PULMONOLOGY | Facility: CLINIC | Age: 65
End: 2025-02-14
Payer: MEDICAID

## 2025-02-14 VITALS
HEIGHT: 64 IN | HEART RATE: 96 BPM | RESPIRATION RATE: 15 BRPM | DIASTOLIC BLOOD PRESSURE: 70 MMHG | OXYGEN SATURATION: 99 % | SYSTOLIC BLOOD PRESSURE: 140 MMHG

## 2025-02-14 DIAGNOSIS — R09.82 POSTNASAL DRIP: ICD-10-CM

## 2025-02-14 DIAGNOSIS — R05.3 CHRONIC COUGH: ICD-10-CM

## 2025-02-14 DIAGNOSIS — G47.33 OBSTRUCTIVE SLEEP APNEA (ADULT) (PEDIATRIC): ICD-10-CM

## 2025-02-14 DIAGNOSIS — E66.01 MORBID (SEVERE) OBESITY DUE TO EXCESS CALORIES: ICD-10-CM

## 2025-02-14 PROCEDURE — G2211 COMPLEX E/M VISIT ADD ON: CPT | Mod: NC

## 2025-02-14 PROCEDURE — 99214 OFFICE O/P EST MOD 30 MIN: CPT

## 2025-02-14 RX ORDER — AZELASTINE HYDROCHLORIDE 137 UG/1
0.1 SPRAY, METERED NASAL
Qty: 1 | Refills: 3 | Status: ACTIVE | COMMUNITY
Start: 2025-02-14 | End: 1900-01-01

## 2025-02-24 ENCOUNTER — APPOINTMENT (OUTPATIENT)
Dept: PULMONOLOGY | Facility: HOSPITAL | Age: 65
End: 2025-02-24
Payer: MEDICAID

## 2025-02-24 ENCOUNTER — OUTPATIENT (OUTPATIENT)
Dept: OUTPATIENT SERVICES | Facility: HOSPITAL | Age: 65
LOS: 1 days | End: 2025-02-24
Payer: MEDICAID

## 2025-02-24 DIAGNOSIS — R06.02 SHORTNESS OF BREATH: ICD-10-CM

## 2025-02-24 DIAGNOSIS — Z98.890 OTHER SPECIFIED POSTPROCEDURAL STATES: Chronic | ICD-10-CM

## 2025-02-24 DIAGNOSIS — Z90.710 ACQUIRED ABSENCE OF BOTH CERVIX AND UTERUS: Chronic | ICD-10-CM

## 2025-02-24 DIAGNOSIS — Z90.49 ACQUIRED ABSENCE OF OTHER SPECIFIED PARTS OF DIGESTIVE TRACT: Chronic | ICD-10-CM

## 2025-02-24 PROCEDURE — 94729 DIFFUSING CAPACITY: CPT | Mod: 26

## 2025-02-24 PROCEDURE — 94726 PLETHYSMOGRAPHY LUNG VOLUMES: CPT

## 2025-02-24 PROCEDURE — 94729 DIFFUSING CAPACITY: CPT

## 2025-02-24 PROCEDURE — 94060 EVALUATION OF WHEEZING: CPT | Mod: 26

## 2025-02-24 PROCEDURE — 94070 EVALUATION OF WHEEZING: CPT

## 2025-02-24 PROCEDURE — 94727 GAS DIL/WSHOT DETER LNG VOL: CPT | Mod: 26

## 2025-02-24 PROCEDURE — 94664 DEMO&/EVAL PT USE INHALER: CPT

## 2025-02-25 DIAGNOSIS — R06.02 SHORTNESS OF BREATH: ICD-10-CM

## 2025-03-12 NOTE — PATIENT PROFILE ADULT. - SPIRITUAL CULTURAL, RELIGIOUS PRACTICES/VALUES, PROFILE
Mychart messaged pt  
Received my chart message from patient wanting to reschedule allergy injection appointment for today 3/12/2025. Attempted to reach patient unable to reach patient.Left voicemail to call office to reschedule this appointment.      
none

## 2025-04-01 ENCOUNTER — OUTPATIENT (OUTPATIENT)
Dept: OUTPATIENT SERVICES | Facility: HOSPITAL | Age: 65
LOS: 1 days | Discharge: HOME | End: 2025-04-01
Payer: MEDICAID

## 2025-04-01 ENCOUNTER — APPOINTMENT (OUTPATIENT)
Dept: SLEEP CENTER | Facility: HOSPITAL | Age: 65
End: 2025-04-01
Payer: MEDICAID

## 2025-04-01 DIAGNOSIS — Z98.890 OTHER SPECIFIED POSTPROCEDURAL STATES: Chronic | ICD-10-CM

## 2025-04-01 DIAGNOSIS — G47.33 OBSTRUCTIVE SLEEP APNEA (ADULT) (PEDIATRIC): ICD-10-CM

## 2025-04-01 DIAGNOSIS — Z90.710 ACQUIRED ABSENCE OF BOTH CERVIX AND UTERUS: Chronic | ICD-10-CM

## 2025-04-01 DIAGNOSIS — Z90.49 ACQUIRED ABSENCE OF OTHER SPECIFIED PARTS OF DIGESTIVE TRACT: Chronic | ICD-10-CM

## 2025-04-01 PROCEDURE — 95810 POLYSOM 6/> YRS 4/> PARAM: CPT

## 2025-04-01 PROCEDURE — 95810 POLYSOM 6/> YRS 4/> PARAM: CPT | Mod: 26

## 2025-04-03 DIAGNOSIS — G47.33 OBSTRUCTIVE SLEEP APNEA (ADULT) (PEDIATRIC): ICD-10-CM

## 2025-04-11 ENCOUNTER — APPOINTMENT (OUTPATIENT)
Dept: PULMONOLOGY | Facility: CLINIC | Age: 65
End: 2025-04-11

## 2025-06-11 ENCOUNTER — APPOINTMENT (OUTPATIENT)
Dept: PULMONOLOGY | Facility: CLINIC | Age: 65
End: 2025-06-11
Payer: MEDICAID

## 2025-06-11 ENCOUNTER — NON-APPOINTMENT (OUTPATIENT)
Age: 65
End: 2025-06-11

## 2025-06-11 VITALS
WEIGHT: 200 LBS | OXYGEN SATURATION: 96 % | HEIGHT: 64 IN | DIASTOLIC BLOOD PRESSURE: 74 MMHG | BODY MASS INDEX: 34.15 KG/M2 | HEART RATE: 84 BPM | SYSTOLIC BLOOD PRESSURE: 132 MMHG

## 2025-06-11 PROCEDURE — G2211 COMPLEX E/M VISIT ADD ON: CPT | Mod: NC

## 2025-06-11 PROCEDURE — 99214 OFFICE O/P EST MOD 30 MIN: CPT
